# Patient Record
Sex: MALE | Race: WHITE | NOT HISPANIC OR LATINO | URBAN - METROPOLITAN AREA
[De-identification: names, ages, dates, MRNs, and addresses within clinical notes are randomized per-mention and may not be internally consistent; named-entity substitution may affect disease eponyms.]

---

## 2020-05-23 LAB — INR PPP: 1.4 (ref 0.9–1.1)

## 2020-05-24 LAB — INR PPP: 1.5 (ref 0.9–1.1)

## 2020-05-25 LAB — INR PPP: 1.4 (ref 0.9–1.1)

## 2020-05-26 LAB — INR PPP: 1.5 (ref 0.9–1.1)

## 2020-05-27 LAB — INR PPP: 1.6 (ref 0.9–1.1)

## 2020-05-28 ENCOUNTER — OFFICE VISIT - HEALTHEAST (OUTPATIENT)
Dept: GERIATRICS | Facility: CLINIC | Age: 85
End: 2020-05-28

## 2020-05-28 ENCOUNTER — COMMUNICATION - HEALTHEAST (OUTPATIENT)
Dept: ANTICOAGULATION | Facility: CLINIC | Age: 85
End: 2020-05-28

## 2020-05-28 DIAGNOSIS — I48.20 CHRONIC ATRIAL FIBRILLATION (H): ICD-10-CM

## 2020-05-28 DIAGNOSIS — I10 ESSENTIAL HYPERTENSION: ICD-10-CM

## 2020-05-28 DIAGNOSIS — L89.321 STAGE I PRESSURE ULCER OF LEFT BUTTOCK: ICD-10-CM

## 2020-05-28 DIAGNOSIS — N32.81 OAB (OVERACTIVE BLADDER): ICD-10-CM

## 2020-05-28 DIAGNOSIS — R53.1 WEAKNESS: ICD-10-CM

## 2020-05-28 LAB — INR PPP: 1.9 (ref 0.9–1.1)

## 2020-05-29 ENCOUNTER — AMBULATORY - HEALTHEAST (OUTPATIENT)
Dept: ADMINISTRATIVE | Facility: CLINIC | Age: 85
End: 2020-05-29

## 2020-05-29 ENCOUNTER — COMMUNICATION - HEALTHEAST (OUTPATIENT)
Dept: ANTICOAGULATION | Facility: CLINIC | Age: 85
End: 2020-05-29

## 2020-05-29 DIAGNOSIS — I48.20 CHRONIC ATRIAL FIBRILLATION (H): ICD-10-CM

## 2020-05-29 LAB — INR PPP: 2.6 (ref 0.9–1.1)

## 2020-06-01 ENCOUNTER — COMMUNICATION - HEALTHEAST (OUTPATIENT)
Dept: ANTICOAGULATION | Facility: CLINIC | Age: 85
End: 2020-06-01

## 2020-06-01 DIAGNOSIS — I48.20 CHRONIC ATRIAL FIBRILLATION (H): ICD-10-CM

## 2020-06-01 LAB — INR PPP: 2.6 (ref 0.9–1.1)

## 2020-06-02 ENCOUNTER — OFFICE VISIT - HEALTHEAST (OUTPATIENT)
Dept: GERIATRICS | Facility: CLINIC | Age: 85
End: 2020-06-02

## 2020-06-02 DIAGNOSIS — I48.20 CHRONIC ATRIAL FIBRILLATION (H): ICD-10-CM

## 2020-06-02 DIAGNOSIS — L89.321 STAGE I PRESSURE ULCER OF LEFT BUTTOCK: ICD-10-CM

## 2020-06-02 DIAGNOSIS — N32.81 OAB (OVERACTIVE BLADDER): ICD-10-CM

## 2020-06-02 DIAGNOSIS — R53.1 WEAKNESS: ICD-10-CM

## 2020-06-02 DIAGNOSIS — I10 ESSENTIAL HYPERTENSION: ICD-10-CM

## 2020-06-04 ENCOUNTER — COMMUNICATION - HEALTHEAST (OUTPATIENT)
Dept: ANTICOAGULATION | Facility: CLINIC | Age: 85
End: 2020-06-04

## 2020-06-04 ENCOUNTER — OFFICE VISIT - HEALTHEAST (OUTPATIENT)
Dept: GERIATRICS | Facility: CLINIC | Age: 85
End: 2020-06-04

## 2020-06-04 ENCOUNTER — RECORDS - HEALTHEAST (OUTPATIENT)
Dept: LAB | Facility: CLINIC | Age: 85
End: 2020-06-04

## 2020-06-04 DIAGNOSIS — I48.20 CHRONIC ATRIAL FIBRILLATION (H): ICD-10-CM

## 2020-06-04 DIAGNOSIS — I10 ESSENTIAL HYPERTENSION: ICD-10-CM

## 2020-06-04 DIAGNOSIS — L89.321 STAGE I PRESSURE ULCER OF LEFT BUTTOCK: ICD-10-CM

## 2020-06-04 DIAGNOSIS — N32.81 OAB (OVERACTIVE BLADDER): ICD-10-CM

## 2020-06-04 LAB
ANION GAP SERPL CALCULATED.3IONS-SCNC: 6 MMOL/L (ref 5–18)
BUN SERPL-MCNC: 16 MG/DL (ref 8–28)
CALCIUM SERPL-MCNC: 8.6 MG/DL (ref 8.5–10.5)
CHLORIDE BLD-SCNC: 104 MMOL/L (ref 98–107)
CO2 SERPL-SCNC: 30 MMOL/L (ref 22–31)
CREAT SERPL-MCNC: 0.69 MG/DL (ref 0.7–1.3)
GFR SERPL CREATININE-BSD FRML MDRD: >60 ML/MIN/1.73M2
GLUCOSE BLD-MCNC: 87 MG/DL (ref 70–125)
INR PPP: 2.2 (ref 0.9–1.1)
POTASSIUM BLD-SCNC: 4.2 MMOL/L (ref 3.5–5)
SODIUM SERPL-SCNC: 140 MMOL/L (ref 136–145)

## 2020-06-08 ENCOUNTER — COMMUNICATION - HEALTHEAST (OUTPATIENT)
Dept: ANTICOAGULATION | Facility: CLINIC | Age: 85
End: 2020-06-08

## 2020-06-08 DIAGNOSIS — I48.20 CHRONIC ATRIAL FIBRILLATION (H): ICD-10-CM

## 2020-06-08 LAB — INR PPP: 2.5 (ref 0.9–1.1)

## 2020-06-09 ENCOUNTER — OFFICE VISIT - HEALTHEAST (OUTPATIENT)
Dept: GERIATRICS | Facility: CLINIC | Age: 85
End: 2020-06-09

## 2020-06-09 DIAGNOSIS — N17.9 AKI (ACUTE KIDNEY INJURY) (H): ICD-10-CM

## 2020-06-09 DIAGNOSIS — H35.30 MACULAR DEGENERATION, UNSPECIFIED LATERALITY, UNSPECIFIED TYPE: ICD-10-CM

## 2020-06-09 DIAGNOSIS — N32.81 OAB (OVERACTIVE BLADDER): ICD-10-CM

## 2020-06-09 DIAGNOSIS — I10 ESSENTIAL HYPERTENSION: ICD-10-CM

## 2020-06-09 DIAGNOSIS — I48.20 CHRONIC ATRIAL FIBRILLATION (H): ICD-10-CM

## 2020-06-11 ENCOUNTER — COMMUNICATION - HEALTHEAST (OUTPATIENT)
Dept: ANTICOAGULATION | Facility: CLINIC | Age: 85
End: 2020-06-11

## 2020-06-11 ENCOUNTER — OFFICE VISIT - HEALTHEAST (OUTPATIENT)
Dept: GERIATRICS | Facility: CLINIC | Age: 85
End: 2020-06-11

## 2020-06-11 DIAGNOSIS — I10 ESSENTIAL HYPERTENSION: ICD-10-CM

## 2020-06-11 DIAGNOSIS — N32.81 OAB (OVERACTIVE BLADDER): ICD-10-CM

## 2020-06-11 DIAGNOSIS — I48.20 CHRONIC ATRIAL FIBRILLATION (H): ICD-10-CM

## 2020-06-11 DIAGNOSIS — H35.30 MACULAR DEGENERATION, UNSPECIFIED LATERALITY, UNSPECIFIED TYPE: ICD-10-CM

## 2020-06-11 DIAGNOSIS — N17.9 AKI (ACUTE KIDNEY INJURY) (H): ICD-10-CM

## 2020-06-11 LAB — INR PPP: 2.9 (ref 0.9–1.1)

## 2020-06-15 ENCOUNTER — COMMUNICATION - HEALTHEAST (OUTPATIENT)
Dept: ANTICOAGULATION | Facility: CLINIC | Age: 85
End: 2020-06-15

## 2020-06-15 DIAGNOSIS — I48.20 CHRONIC ATRIAL FIBRILLATION (H): ICD-10-CM

## 2020-06-15 LAB — INR PPP: 2.8 (ref 0.9–1.1)

## 2020-06-16 ENCOUNTER — OFFICE VISIT - HEALTHEAST (OUTPATIENT)
Dept: GERIATRICS | Facility: CLINIC | Age: 85
End: 2020-06-16

## 2020-06-16 DIAGNOSIS — H35.30 MACULAR DEGENERATION, UNSPECIFIED LATERALITY, UNSPECIFIED TYPE: ICD-10-CM

## 2020-06-16 DIAGNOSIS — N32.81 OAB (OVERACTIVE BLADDER): ICD-10-CM

## 2020-06-16 DIAGNOSIS — I10 ESSENTIAL HYPERTENSION: ICD-10-CM

## 2020-06-16 DIAGNOSIS — N17.9 AKI (ACUTE KIDNEY INJURY) (H): ICD-10-CM

## 2020-06-16 DIAGNOSIS — I48.20 CHRONIC ATRIAL FIBRILLATION (H): ICD-10-CM

## 2020-06-17 ENCOUNTER — AMBULATORY - HEALTHEAST (OUTPATIENT)
Dept: GERIATRICS | Facility: CLINIC | Age: 85
End: 2020-06-17

## 2020-06-18 ENCOUNTER — AMBULATORY - HEALTHEAST (OUTPATIENT)
Dept: ANTICOAGULATION | Facility: CLINIC | Age: 85
End: 2020-06-18

## 2020-06-18 DIAGNOSIS — I48.20 CHRONIC ATRIAL FIBRILLATION (H): ICD-10-CM

## 2021-05-28 ENCOUNTER — RECORDS - HEALTHEAST (OUTPATIENT)
Dept: ADMINISTRATIVE | Facility: CLINIC | Age: 86
End: 2021-05-28

## 2021-06-08 NOTE — PROGRESS NOTES
Code Status:  FULL CODE  Visit Type: Problem Visit (SHAWNA)     Facility:  [facility] [[id number]]        Facility Type: SNF (Skilled Nursing Facility, TCU)    History of Present Illness: Justin Isidro is a 87 y.o. male who I am seeing today for follow up of SHAWNA on the TCU. Pt recently hospitalized at [facility] on 5/24/2020.  Patient with met past medical history that includes BPH, prostate CA status post brachii therapy with resulting radiation cystitis, erectile dysfunction and overactive bladder syndrome, DVT, subdural hematoma in 2009, atrial fib, DJD and hypertension.  There is no discharge summary for review.  Patient admitted with weakness, acute kidney injury and HTN.  patient given additional metoprolol.  He did have A. fib with rapid ventricular response.  This appears to  improved with metoprolol.  Patient with generalized weakness and inability to ambulate for a few days prior to hospitalization.  Head CT was negative other than ischemic changes.  A chest x-ray was obtained which showed atelectasis of the left lower lobe.  The urinalysis had positive blood otherwise negative.  Culture was negative.  Overactive bladder symptoms on tolterodine.  COVID-19 was negative.  Sodium 134.  AST is 58.  BUN and creatinine 26.  .  Platelets 134,000.  INR was 1.9.  He also continues on digoxin.    Today patient sitting up in bedside chair.  Patient strength improving.  Acute kidney injury.  BMP shows creatinine at 0.69.  Blood pressure satisfactory.  Atrial.  On chronic anticoagulation.  Patient with history of macular degeneration.  He does receive eye injections.  He was to receive an eye injection prior to hospitalization.  Nursing is working on rescheduling this visit upon discharge.    Current Outpatient Medications   Medication Sig     cholecalciferol, vitamin D3, 1,000 unit (25 mcg) tablet Take 1,000 Units by mouth 2 (two) times a day.     digoxin (LANOXIN) 125 mcg (0.125 mg) tablet  Take 125 mcg by mouth daily.     folic acid (FOLVITE) 400 MCG tablet Take 400 mcg by mouth every other day.     metoprolol succinate (TOPROL-XL) 25 MG Take 75 mg by mouth 2 (two) times a day.     senna (SENOKOT) 8.6 mg tablet Take 1-2 tablets by mouth 2 (two) times a day.     senna-docusate (SENNOSIDES-DOCUSATE SODIUM) 8.6-50 mg tablet Take 1 tablet by mouth 2 (two) times a day as needed for constipation.     simvastatin (ZOCOR) 40 MG tablet Take 40 mg by mouth daily.     tolterodine (DETROL LA) 4 MG ER capsule Take 4 mg by mouth daily.     warfarin sodium (WARFARIN ORAL) Take by mouth. 5 mg daily. Next INR 6/1       Allergies   Allergen Reactions     Penicillins          Review of Systems   No fevers or chills. No headache, lightheadedness or dizziness. No SOB, chest pains or palpitations. Appetite is good. No nausea, vomiting, constipation or diarrhea. No dysuria, frequency, burning or pain with urination. Otherwise review of systems are negative.       Physical Exam   PHYSICAL EXAMINATION:  Vital signs: /67, pulse 85, respirations 16, temperature 97.9, O2 sat 96% on room air.  Weight 187 pounds.  General: Awake, Alert, oriented x2, appropriately, follows simple commands, conversant  HEENT:PERRLA, Pink conjunctiva, anicteric sclerae, moist oral mucosa.  Poor dentition  NECK: Supple, without any lymphadenopathy, or masses  CVS:  S1  S2, without murmur or gallop.   LUNG: Clear to auscultation, No wheezes, rales or rhonci.  BACK: No kyphosis of the thoracic spine  ABDOMEN: Soft, nontender to palpation, with positive bowel sounds  EXTREMITIES: Movesboth upper and lower extremities with generalized weakness, trace pedal edema, no calf tenderness  SKIN:  Skin dry and intact.   NEUROLOGIC: Intact, pulses palpable  PSYCHIATRIC: Mild cognitive impairment noted.  CPT 4.8.            Labs:    Results for orders placed or performed in visit on 06/04/20   Basic Metabolic Panel   Result Value Ref Range    Sodium 140 136 -  145 mmol/L    Potassium 4.2 3.5 - 5.0 mmol/L    Chloride 104 98 - 107 mmol/L    CO2 30 22 - 31 mmol/L    Anion Gap, Calculation 6 5 - 18 mmol/L    Glucose 87 70 - 125 mg/dL    Calcium 8.6 8.5 - 10.5 mg/dL    BUN 16 8 - 28 mg/dL    Creatinine 0.69 (L) 0.70 - 1.30 mg/dL    GFR MDRD Af Amer >60 >60 mL/min/1.73m2    GFR MDRD Non Af Amer >60 >60 mL/min/1.73m2           Assessment/Plan:   1.  Kamilla BMP with creatine of 0.69 other wise unremarkable.    2. Chronic atrial fibrillation   continues on metoprolol and digoxin.  Lovenox discontinued. Continue on Coumadin.   INRs managed per the Coumadin clinic.     3. Essential hypertension  Satisfactory control.   4. OAB (overactive bladder)   continues on tolterodine.   5.   Macular degeneration  Patient receives eye injections.  He will have follow-up for injection upon discharge.       Electronically signed by: Kathy Payton, SHARON

## 2021-06-08 NOTE — PROGRESS NOTES
Code Status:  FULL CODE  Visit Type: Discharge Summary     Facility:  [facility] [[id number]]        Facility Type: SNF (Skilled Nursing Facility, TCU)    History of Present Illness: Justin Isidro is a 88 y.o. male who I am seeing today for discharge from the TCU. Pt recently hospitalized at [facility] on 5/24/2020 due to acute kidney injury. Past medical history that includes BPH, prostate CA status post brachii therapy with resulting radiation cystitis, erectile dysfunction and overactive bladder syndrome, DVT, subdural hematoma in 2009, atrial fib, DJD and hypertension.  There is no discharge summary for review.  Patient admitted with weakness, acute kidney injury and HTN.  patient given additional metoprolol.  He did have A. fib with rapid ventricular response.  This appears to  improved with metoprolol.  Patient with generalized weakness and inability to ambulate for a few days prior to hospitalization.  Head CT was negative other than ischemic changes.  A chest x-ray was obtained which showed atelectasis of the left lower lobe.  The urinalysis had positive blood otherwise negative.  Culture was negative.  Overactive bladder symptoms on tolterodine.  COVID-19 was negative.  Sodium 134.  AST is 58.  BUN and creatinine 26.  .  Platelets 134,000.  INR was 1.9.  He also continues on digoxin.    Today patient ambulating with a rolling walker.  He was assisted to a sitting position. Patient admitted with acute kidney injury.  BMP has returned to normal. Patient voiding adequately.  History of overactive bladder syndrome on tolterodine.HTN.  blood pressure satisfactory.  Chronic atrial fib on Coumadin. INR stable.  History of macular degeneration.  He will go for an eye injection tomorrow.   Patient denies any pain.  Previous pressure ulcer to his bottom now healed.      Current Outpatient Medications   Medication Sig     cholecalciferol, vitamin D3, 1,000 unit (25 mcg) tablet Take 1,000  Units by mouth 2 (two) times a day.     digoxin (LANOXIN) 125 mcg (0.125 mg) tablet Take 125 mcg by mouth daily.     folic acid (FOLVITE) 400 MCG tablet Take 400 mcg by mouth every other day.     metoprolol succinate (TOPROL-XL) 25 MG Take 75 mg by mouth 2 (two) times a day.     senna (SENOKOT) 8.6 mg tablet Take 1-2 tablets by mouth 2 (two) times a day.     senna-docusate (SENNOSIDES-DOCUSATE SODIUM) 8.6-50 mg tablet Take 1 tablet by mouth 2 (two) times a day as needed for constipation.     simvastatin (ZOCOR) 40 MG tablet Take 40 mg by mouth daily.     tolterodine (DETROL LA) 4 MG ER capsule Take 4 mg by mouth daily.     warfarin sodium (WARFARIN ORAL) Take by mouth. 5 mg daily. Next INR 6/1       Allergies   Allergen Reactions     Penicillins          Review of Systems   No fevers or chills. No headache, lightheadedness or dizziness. No SOB, chest pains or palpitations. Appetite is good. No nausea, vomiting, constipation or diarrhea. No dysuria, frequency, burning or pain with urination. Otherwise review of systems are negative.       Physical Exam   PHYSICAL EXAMINATION:  Vital signs: /71, pulse 64, respirations 16, temperature 97.9, O2 sat 97% on room air.  Weight 189.4 pounds.  General: Awake, Alert, oriented x2, appropriately, follows simple commands, conversant  HEENT:Pink conjunctiva, anicteric sclerae, moist oral mucosa.  Poor dentition  NECK: Supple, without any lymphadenopathy, or masses  CVS:  S1  S2, without murmur or gallop.   LUNG: Clear to auscultation, No wheezes, rales or rhonci.  BACK: No kyphosis of the thoracic spine  ABDOMEN: Soft, nontender to palpation, with positive bowel sounds  EXTREMITIES: Movesboth upper and lower extremities with generalized weakness, trace pedal edema, no calf tenderness. Ambulates with rolling walker..   SKIN:  Skin dry and intact.   NEUROLOGIC: Intact, pulses palpable  PSYCHIATRIC: Mild cognitive impairment noted.  CPT 4.8.            Labs:    Results for  orders placed or performed in visit on 06/04/20   Basic Metabolic Panel   Result Value Ref Range    Sodium 140 136 - 145 mmol/L    Potassium 4.2 3.5 - 5.0 mmol/L    Chloride 104 98 - 107 mmol/L    CO2 30 22 - 31 mmol/L    Anion Gap, Calculation 6 5 - 18 mmol/L    Glucose 87 70 - 125 mg/dL    Calcium 8.6 8.5 - 10.5 mg/dL    BUN 16 8 - 28 mg/dL    Creatinine 0.69 (L) 0.70 - 1.30 mg/dL    GFR MDRD Af Amer >60 >60 mL/min/1.73m2    GFR MDRD Non Af Amer >60 >60 mL/min/1.73m2           Assessment/Plan:   1.  Kamilla BMP with creatine of 0.69 other wise unremarkable.    2. Chronic atrial fibrillation   continues on metoprolol and digoxin.  Lovenox discontinued. Continue on Coumadin.   INR 2.90. INRs managed per the coumadin clinic.    3. Essential hypertension  Satisfactory control.   4. OAB (overactive bladder)   continues on tolterodine.   5.   Macular degeneration  Patient receives eye injections.  He will have follow-up for injection in am.      Okay to GA to [facility] with current medications and treatments.  Home PT, OT, home health aide and RN for medication management and blood draws.  Recheck INR on 6/22/20. Follow up with PCP in 1 week.     DISCHARGE PLAN/FACE TO FACE:  I certify that this patient is under my care and that I, or a nurse practitioner or physician's assistant working with me, had a face-to-face encounter that meets the physician face-to-face encounter requirements with this patient.        I certify that, based on my findings, the following services are medically necessary home health services.    My clinical findings support the need for the above skilled services.    This patient is homebound because: recent  Hospitalization for acute kidney injury.    The patient is, or has been, under my care and I have initiated the establishment of the plan of care. This patient will be followed by a physician who will periodically review the plan of care.    Electronically signed by: Kathy Payton, SHARON

## 2021-06-08 NOTE — TELEPHONE ENCOUNTER
ANTICOAGULATION  MANAGEMENT    Assessment     Today's INR result of 2.8 is Therapeutic (goal INR of 2.0-3.0)        Previous INR was Therapeutic    Warfarin given as previously instructed    No new health/diet changes affecting INR    No new medication/supplements affecting INR    Continues to tolerate warfarin with no reported s/s of bleeding or thromboembolism       Plan:     Warfarin Dosing Orders:  Continue current warfarin dose 5 mg daily.    Next INR: Thurs 6/18.     Telephone orders given to nurseSusana.  Orders read back correctly.     Willy Avila RN    Subjective/Objective:      Justin Isidro, a 88 y.o. male is on warfarin. Facility nurse reports for Justin:    Other anticoagulants: No    Medication changes: No     Missed warfarin doses since last INR: No     Abnormal bleeding since last INR: No    New symptoms, injury or illness: No     Upcoming surgery, procedure or cardioversion: No    Recent INR Results:    Lab Results   Component Value Date    INR 2.80 (!) 06/15/2020    INR 2.90 (!) 06/11/2020    INR 2.50 (!) 06/08/2020       Anticoagulation Episode Summary     Current INR goal:   2.0-3.0   TTR:   100.0 % (1.1 wk)   Next INR check:   6/18/2020   INR from last check:   2.80 (6/15/2020)   Weekly max warfarin dose:      Target end date:      INR check location:      Preferred lab:      Send INR reminders to:   [facility] (TCU/LTC/shelter)    Indications    Chronic atrial fibrillation (H) [I48.20]           Comments:            Anticoagulation Care Providers     Provider Role Specialty Phone number    Rajeev Tillman DO [facility] [phone]

## 2021-06-08 NOTE — TELEPHONE ENCOUNTER
ANTICOAGULATION  MANAGEMENT    Assessment     Today's INR result of 2.6 is Therapeutic (goal INR of 2.0-3.0)        Previous INR was Therapeutic    Warfarin given as previously instructed    No new health/diet changes affecting INR    No new medication/supplements affecting INR    Continues to tolerate warfarin with no reported s/s of bleeding or thromboembolism       Plan:     Warfarin Dosing Orders:  Continue current warfarin dose 5 mg daily.    Next INR: Thurs 6/4.     Telephone orders given to nurseZofia.  Orders read back correctly.     Willy Avila RN    Subjective/Objective:      Justin Isidro, a 87 y.o. male is on warfarin. Facility nurse reports for Justin:    Other anticoagulants: No    Medication changes: No     Missed warfarin doses since last INR: No     Abnormal bleeding since last INR: No    New symptoms, injury or illness: No     Upcoming surgery, procedure or cardioversion: No    Recent INR Results:    Lab Results   Component Value Date    INR 2.60 (!) 06/01/2020    INR 2.60 (!) 05/29/2020    INR 1.90 (!) 05/28/2020       Anticoagulation Episode Summary     Current INR goal:   2.0-3.0   TTR:   --   Next INR check:   6/4/2020   INR from last check:   2.60 (6/1/2020)   Weekly max warfarin dose:      Target end date:      INR check location:      Preferred lab:      Send INR reminders to:   [facility] (TCU/LTC/OMER)    Indications    Chronic atrial fibrillation [I48.20]           Comments:            Anticoagulation Care Providers     Provider Role Specialty Phone number    Rajeev Tillman DO [facility] [phone]

## 2021-06-08 NOTE — PROGRESS NOTES
ANTICOAGULATION  MANAGEMENT PROGRAM    Justin Isidro is being discharged from the [facility] Anticoagulation Management Program (AC).    Reason for discharge: discharged from [facility] care; returning to pre-admission warfarin management    ACM referral closed, anticoagulation episode resolved and INR standing order discontinued.     If Justin needs warfarin management in the future, please send a new referral.    Willy Avila RN

## 2021-06-08 NOTE — PROGRESS NOTES
Code Status:  FULL CODE  Visit Type: Problem Visit     Facility:  [facility] [[id number]]        Facility Type: SNF (Skilled Nursing Facility, TCU)    History of Present Illness: Justin Isidro is a 87 y.o. male who I am seeing today for follow up on the TCU. Pt recently hospitalized at [facility] on 5/24/2020.  Patient with met past medical history that includes BPH, prostate CA status post brachii therapy with resulting radiation cystitis, erectile dysfunction and overactive bladder syndrome, DVT, subdural hematoma in 2009, atrial fib, DJD and hypertension.  There is no discharge summary for review.  Patient admitted with weakness, acute kidney injury and HTN.  patient given additional metoprolol.  He did have A. fib with rapid ventricular response.  This appears to  improved with metoprolol.  Patient with generalized weakness and inability to ambulate for a few days prior to hospitalization.  Head CT was negative other than ischemic changes.  A chest x-ray was obtained which showed atelectasis of the left lower lobe.  The urinalysis had positive blood otherwise negative.  Culture was negative.  Overactive bladder symptoms on tolterodine.  COVID-19 was negative.  Sodium 134.  AST is 58.  BUN and creatinine 26.  .  Platelets 134,000.  INR was 1.9.  He also continues on digoxin.    Today patient sitting up in bedside chair. Pt strength improving. He reports eating well and having regular bowel movements. He complains of pain in his bottom. He previously had a stage I pressure ulcer. Today area without redness, induration or drainage. Skin dry and intact. HTN. Blood pressures satisfactory controlled. Atrial fib on coumadin and rate controlled with digoxin.     Current Outpatient Medications   Medication Sig     cholecalciferol, vitamin D3, 1,000 unit (25 mcg) tablet Take 1,000 Units by mouth 2 (two) times a day.     digoxin (LANOXIN) 125 mcg (0.125 mg) tablet Take 125 mcg by mouth daily.      folic acid (FOLVITE) 400 MCG tablet Take 400 mcg by mouth every other day.     metoprolol succinate (TOPROL-XL) 25 MG Take 75 mg by mouth 2 (two) times a day.     senna (SENOKOT) 8.6 mg tablet Take 1-2 tablets by mouth 2 (two) times a day.     senna-docusate (SENNOSIDES-DOCUSATE SODIUM) 8.6-50 mg tablet Take 1 tablet by mouth 2 (two) times a day as needed for constipation.     simvastatin (ZOCOR) 40 MG tablet Take 40 mg by mouth daily.     tolterodine (DETROL LA) 4 MG ER capsule Take 4 mg by mouth daily.     warfarin sodium (WARFARIN ORAL) Take by mouth. 5 mg daily. Next INR 6/1       Allergies   Allergen Reactions     Penicillins          Review of Systems   No fevers or chills. No headache, lightheadedness or dizziness. No SOB, chest pains or palpitations. Appetite is good. No nausea, vomiting, constipation or diarrhea. No dysuria, frequency, burning or pain with urination.  Patient complains of pain in his bottom.  Otherwise review of systems are negative.       Physical Exam   PHYSICAL EXAMINATION:  Vital signs: /72, pulse 80, respirations 16, temperature 98.1, O2 sat 93% on room air.  Weight 186.4 pounds.  General: Awake, Alert, oriented x2, appropriately, follows simple commands, conversant  HEENT:PERRLA, Pink conjunctiva, anicteric sclerae, moist oral mucosa.  Poor dentition  NECK: Supple, without any lymphadenopathy, or masses  CVS:  S1  S2, without murmur or gallop.   LUNG: Clear to auscultation, No wheezes, rales or rhonci.  BACK: No kyphosis of the thoracic spine  ABDOMEN: Soft, nontender to palpation, with positive bowel sounds  EXTREMITIES: Movesboth upper and lower extremities with generalized weakness, trace pedal edema, no calf tenderness  SKIN:  Left buttock without redness, induration or warmth.  Skin dry and intact. Some tenderness to touch.  NEUROLOGIC: Intact, pulses palpable  PSYCHIATRIC: Mild cognitive impairment noted.              Labs:    Reviewed in the  records.    Assessment/Plan:   1. Chronic atrial fibrillation   continues on metoprolol and digoxin.  Lovenox discontinued. Continue on Coumadin.   INRs managed per the Coumadin clinic.     2. Essential hypertension  Satisfactory control.   3. OAB (overactive bladder)   continues on tolterodine.   4. Kamilla  Follow up BMP on Thursday.    5. Stage I pressure ulcer of left buttock  Resolved.             Electronically signed by: Kathy Payton, SHARON

## 2021-06-08 NOTE — TELEPHONE ENCOUNTER
ANTICOAGULATION  MANAGEMENT    Assessment     Today's INR result of 1.9 is Subtherapeutic (goal INR of 2.0-3.0)        Previous INR was Subtherapeutic    Warfarin given as previously instructed    Acute health changes, generalized weakness, may be affecting INR    No new medication/supplements affecting INR    Concurrent use of Lovenox and warfarin may increase risk of bleeding, but not expected to affect INR    Continues to tolerate warfarin with no reported s/s of bleeding or thromboembolism       Plan:     Warfarin Dosing Orders: Give 6.25 mg today.     Continue Lovenox q12h.     Next INR: tmr 5/29.     Telephone orders given to nurse, Madalyn.  Orders read back correctly.     Willy Avila RN    Subjective/Objective:      Justin Isidro, a 87 y.o. male is on warfarin recently admitted to TCU under care of [facility].  Chart reviewed:    Outpatient anticoagulation information:     Anticoagulation management provider: [facility]    Reason for anticoagulation: Atrial Fibrillation    Home INR goal: 2-3    Home warfarin dose:  6.25 mg daily on MWF; and 5 mg daily rest of week     Recent hospitalization review:    Reason for hospitalization prior to TCU admission: generalized weakness.    Hospital warfarin management: More warfarin administered than maintenance regimen; anticoagulation calendar update    Hospital medication changes pertinent to anticoagulation: Yes, Lovenox bridge    Health changes pertinent to anticoagulation during hospitalization: No      TCU Facility nurse report since admission:    Other anticoagulants: Yes: Lovenox q12h.     Medication changes: No    Missed warfarin doses since last INR: No     Abnormal bleeding since last INR: No    New symptoms, injury or illness: No     Upcoming surgery, procedure or cardioversion: No      Recent INR Results:    Lab Results   Component Value Date    INR 1.90 (!) 05/28/2020    INR 1.60 (!) 05/27/2020    INR 1.50 (!)  05/26/2020       Anticoagulation Episode Summary     Current INR goal:   2.0-3.0   TTR:   --   Next INR check:   5/29/2020   INR from last check:   1.60! (5/27/2020)   Weekly max warfarin dose:      Target end date:      INR check location:      Preferred lab:      Send INR reminders to:   [facility] (TCU/LTC/OMER)    Indications    Chronic atrial fibrillation [I48.20]           Comments:            Anticoagulation Care Providers     Provider Role Specialty Phone number    Rajeev Tillman,  [facility] [phone]

## 2021-06-08 NOTE — TELEPHONE ENCOUNTER
ANTICOAGULATION  MANAGEMENT    Assessment     Today's INR result of 2.9 is Therapeutic (goal INR of 2.0-3.0)        Previous INR was Therapeutic    Warfarin given as previously instructed    No new health/diet changes affecting INR    No new medication/supplements affecting INR    Continues to tolerate warfarin with no reported s/s of bleeding or thromboembolism       Plan:     Warfarin Dosing Orders:  Continue current warfarin dose 5 mg daily.    Next INR: Mon 6/15.     Telephone orders given to nurseGaviota.  Orders read back correctly.     Willy Avila RN    Subjective/Objective:      Justin Isidro, a 87 y.o. male is on warfarin. Facility nurse reports for Justin:    Other anticoagulants: No    Medication changes: No     Missed warfarin doses since last INR: No     Abnormal bleeding since last INR: No    New symptoms, injury or illness: No     Upcoming surgery, procedure or cardioversion: No    Recent INR Results:    Lab Results   Component Value Date    INR 2.90 (!) 06/11/2020    INR 2.50 (!) 06/08/2020    INR 2.20 (!) 06/04/2020       Anticoagulation Episode Summary     Current INR goal:   2.0-3.0   TTR:   100.0 % (4 d)   Next INR check:   6/15/2020   INR from last check:   2.90 (6/11/2020)   Weekly max warfarin dose:      Target end date:      INR check location:      Preferred lab:      Send INR reminders to:   [facility] (TCU/LTC/OMER)    Indications    Chronic atrial fibrillation [I48.20]           Comments:            Anticoagulation Care Providers     Provider Role Specialty Phone number    Rajeev Tillman DO [facility] [phone]

## 2021-06-08 NOTE — TELEPHONE ENCOUNTER
ANTICOAGULATION  MANAGEMENT    Assessment     Today's INR result of 2.2 is Therapeutic (goal INR of 2.0-3.0)        Previous INR was Therapeutic    Warfarin given as previously instructed    No new health/diet changes affecting INR    No new medication/supplements affecting INR    Continues to tolerate warfarin with no reported s/s of bleeding or thromboembolism       Plan:     Warfarin Dosing Orders:  Give 6.25 mg on Thur and Sat; 5 mg on Fri and Sun.    Next INR: Mon 6/8.     Telephone orders given to nurseRachelle.  Orders read back correctly.     Willy Avila RN    Subjective/Objective:      Justin GEORGE Dwaine, a 87 y.o. male is on warfarin. Facility nurse reports for Justin:    Other anticoagulants: No    Medication changes: No     Missed warfarin doses since last INR: No     Abnormal bleeding since last INR: No    New symptoms, injury or illness: No     Upcoming surgery, procedure or cardioversion: No    Recent INR Results:    Lab Results   Component Value Date    INR 2.20 (!) 06/04/2020    INR 2.60 (!) 06/01/2020    INR 2.60 (!) 05/29/2020       Anticoagulation Episode Summary     Current INR goal:   2.0-3.0   TTR:   --   Next INR check:   6/8/2020   INR from last check:   2.20 (6/4/2020)   Weekly max warfarin dose:      Target end date:      INR check location:      Preferred lab:      Send INR reminders to:   [facility] (TCU/LTC/penitentiary)    Indications    Chronic atrial fibrillation [I48.20]           Comments:            Anticoagulation Care Providers     Provider Role Specialty Phone number    Rajeev Tillman DO [facility] [phone]

## 2021-06-08 NOTE — TELEPHONE ENCOUNTER
ANTICOAGULATION  MANAGEMENT    Assessment     Today's INR result of 2.5 is Therapeutic (goal INR of 2.0-3.0)        Previous INR was Therapeutic    Warfarin given as previously instructed    No new health/diet changes affecting INR    No new medication/supplements affecting INR    Continues to tolerate warfarin with no reported s/s of bleeding or thromboembolism       Plan:     Warfarin Dosing Orders:  Give 5 mg Mon-Wed.    Next INR: Thurs 6/11.    Telephone orders given to nurseKandace.  Orders read back correctly.     Willy Avila RN    Subjective/Objective:      Justin Isidro, a 87 y.o. male is on warfarin. Facility nurse reports for Justin:    Other anticoagulants: No    Medication changes: No     Missed warfarin doses since last INR: No     Abnormal bleeding since last INR: No    New symptoms, injury or illness: No     Upcoming surgery, procedure or cardioversion: No    Recent INR Results:    Lab Results   Component Value Date    INR 2.50 (!) 06/08/2020    INR 2.20 (!) 06/04/2020    INR 2.60 (!) 06/01/2020       Anticoagulation Episode Summary     Current INR goal:   2.0-3.0   TTR:   100.0 % (1 d)   Next INR check:   6/11/2020   INR from last check:   2.50 (6/8/2020)   Weekly max warfarin dose:      Target end date:      INR check location:      Preferred lab:      Send INR reminders to:   [facility] (TCU/LTC/[facility])    Indications    Chronic atrial fibrillation [I48.20]           Comments:            Anticoagulation Care Providers     Provider Role Specialty Phone number    Rajeev Tillman DO [facility] [phone]

## 2021-06-08 NOTE — PROGRESS NOTES
Code Status:  FULL CODE  Visit Type: H & P     Facility:  [facility] [[id number]]        Facility Type: SNF (Skilled Nursing Facility, TCU)    History of Present Illness: Justin Isidro is a 87 y.o. male who I am seeing today for admit to the TCU. Pt recently hospitalized at  on 5/24/2020.  Patient with met past medical history that includes BPH, prostate CA status post brachii therapy with resulting radiation cystitis, erectile dysfunction and overactive bladder syndrome, DVT, subdural hematoma in 2009, atrial fib, DJD and hypertension.  There is no discharge summary for review.  Patient admitted with weakness, acute kidney injury and HTN.  patient given additional metoprolol.  He did have A. fib with rapid ventricular response.  This appears to  improved with metoprolol.  Patient with generalized weakness and inability to ambulate for a few days prior to hospitalization.  Head CT was negative other than ischemic changes.  A chest x-ray was obtained which showed atelectasis of the left lower lobe.  The urinalysis had positive blood otherwise negative.  Culture was negative.  Overactive bladder symptoms on tolterodine.  COVID-19 was negative.  Sodium 134.  AST is 58.  BUN and creatinine 26.  .  Platelets 134,000.  INR was 1.9.  He also continues on digoxin.    Today patient sitting up in bed.  I did see him earlier ambulating around the hallway with family.  Continues with moderate weakness.  Some underlying cognitive impairment.  Patient reports some pain in his buttock.  He did have a pressure ulcer on the left buttock.  It is open to air.  Somewhat tender to touch with tenderness.  I will have nursing staff apply topical treatment.  Patient reports he is voiding adequately.  Having regular bowel movements.  Patient denies any chest pain, shortness of breath or chest palpitations.      Current Outpatient Medications   Medication Sig     cholecalciferol, vitamin D3, 1,000 unit  (25 mcg) tablet Take 1,000 Units by mouth 2 (two) times a day.     digoxin (LANOXIN) 125 mcg (0.125 mg) tablet Take 125 mcg by mouth daily.     enoxaparin ANTICOAGULANT (LOVENOX) 80 mg/0.8 mL syringe Inject 85 mg under the skin every 12 (twelve) hours. Stop once INR if above 2.0.     folic acid (FOLVITE) 400 MCG tablet Take 400 mcg by mouth every other day.     metoprolol succinate (TOPROL-XL) 25 MG Take 75 mg by mouth 2 (two) times a day.     senna-docusate (SENNOSIDES-DOCUSATE SODIUM) 8.6-50 mg tablet Take 1 tablet by mouth 2 (two) times a day as needed for constipation.     simvastatin (ZOCOR) 40 MG tablet Take 40 mg by mouth daily.     tolterodine (DETROL LA) 4 MG ER capsule Take 4 mg by mouth daily.       Allergies   Allergen Reactions     Penicillins          Review of Systems   No fevers or chills. No headache, lightheadedness or dizziness. No SOB, chest pains or palpitations. Appetite is good. No nausea, vomiting, constipation or diarrhea. No dysuria, frequency, burning or pain with urination.  Patient complains of pain in his bottom.  Otherwise review of systems are negative.       Physical Exam   PHYSICAL EXAMINATION:  Vital signs: /94, pulse 69, respirations 16, temperature 97.9, O2 sat 98% on room air.  Weight 190.4 pounds.  General: Awake, Alert, oriented x2, appropriately, follows simple commands, conversant  HEENT:PERRLA, Pink conjunctiva, anicteric sclerae, moist oral mucosa.  Poor dentition  NECK: Supple, without any lymphadenopathy, or masses  CVS:  S1  S2, without murmur or gallop.   LUNG: Clear to auscultation, No wheezes, rales or rhonci.  BACK: No kyphosis of the thoracic spine  ABDOMEN: Soft, nontender to palpation, with positive bowel sounds  EXTREMITIES: Movesboth upper and lower extremities with generalized weakness, trace pedal edema, no calf tenderness  SKIN: Warm and dry, no bruits  Reviewed.  With very little amounts.  Left buttock with slight redness.  Some tenderness to  touch.  NEUROLOGIC: Intact, pulses palpable  PSYCHIATRIC: Mild cognitive impairment noted.              Labs:    Reviewed in the records.    Assessment/Plan:   1. Chronic atrial fibrillation   continues on metoprolol and digoxin.  Also on Lovenox and Coumadin.  DC Lovenox when INR above 2.  INRs managed per the Coumadin clinic.     2. Essential hypertension  Satisfactory control.   3. OAB (overactive bladder)   continues on tolterodine.   4. Weakness   continues with PT.   5. Stage I pressure ulcer of left buttock  Apply anurag and foam dressing Q 3 days. Turn and reposition.          Total 45 minutes of which 50% was spent  interviewing nursing staff, interviewing patient as well as therapy. Follow up CBC and BMP on Monday.       Electronically signed by: Kathy Payton CNP

## 2021-06-08 NOTE — TELEPHONE ENCOUNTER
ANTICOAGULATION  MANAGEMENT    Assessment     Today's INR result of 2.6 is Therapeutic (goal INR of 2.0-3.0)        Previous INR was Subtherapeutic    Warfarin given as previously instructed    No new health/diet changes affecting INR    No new medication/supplements affecting INR    Continues to tolerate warfarin with no reported s/s of bleeding or thromboembolism       Plan:     Warfarin Dosing Orders: Give 5 mg daily.    Discontinue Lovenox now.     Next INR: Mon 6/1.     Telephone orders given to nurseMadalyn.  Orders read back correctly.     Willy Avila RN    Subjective/Objective:      Justinmaddie Yinke, a 87 y.o. male is on warfarin. Facility nurse reports for Jsutin:    Other anticoagulants: No    Medication changes: No     Missed warfarin doses since last INR: No     Abnormal bleeding since last INR: No    New symptoms, injury or illness: No     Upcoming surgery, procedure or cardioversion: No    Recent INR Results:    Lab Results   Component Value Date    INR 2.60 (!) 05/29/2020    INR 1.90 (!) 05/28/2020    INR 1.60 (!) 05/27/2020       Anticoagulation Episode Summary     Current INR goal:   2.0-3.0   TTR:   --   Next INR check:   6/1/2020   INR from last check:   2.60 (5/29/2020)   Weekly max warfarin dose:      Target end date:      INR check location:      Preferred lab:      Send INR reminders to:   [facility] (TCU/LTC/long term)    Indications    Chronic atrial fibrillation [I48.20]           Comments:            Anticoagulation Care Providers     Provider Role Specialty Phone number    Rajeev Tillman DO [facility] [phone]

## 2021-06-08 NOTE — PROGRESS NOTES
Code Status:  FULL CODE  Visit Type: Problem Visit     Facility:  [facility] [[id number]]        Facility Type: SNF (Skilled Nursing Facility, TCU)    History of Present Illness: Justin Isidro is a 87 y.o. male who I am seeing today for follow up on the TCU. Pt recently hospitalized at [facility] on 5/24/2020.  Patient with met past medical history that includes BPH, prostate CA status post brachii therapy with resulting radiation cystitis, erectile dysfunction and overactive bladder syndrome, DVT, subdural hematoma in 2009, atrial fib, DJD and hypertension.  There is no discharge summary for review.  Patient admitted with weakness, acute kidney injury and HTN.  patient given additional metoprolol.  He did have A. fib with rapid ventricular response.  This appears to  improved with metoprolol.  Patient with generalized weakness and inability to ambulate for a few days prior to hospitalization.  Head CT was negative other than ischemic changes.  A chest x-ray was obtained which showed atelectasis of the left lower lobe.  The urinalysis had positive blood otherwise negative.  Culture was negative.  Overactive bladder symptoms on tolterodine.  COVID-19 was negative.  Sodium 134.  AST is 58.  BUN and creatinine 26.  .  Platelets 134,000.  INR was 1.9.  He also continues on digoxin.    Today patient sitting up in bedside chair.  Hypertension.  Blood pressure satisfactory controlled.  Pressure ulcer to his bottom healed.  Atrial fib on chronic anticoagulation.  Strength improving.  Acute kidney injury.  BMP reviewed today unremarkable.      Current Outpatient Medications   Medication Sig     cholecalciferol, vitamin D3, 1,000 unit (25 mcg) tablet Take 1,000 Units by mouth 2 (two) times a day.     digoxin (LANOXIN) 125 mcg (0.125 mg) tablet Take 125 mcg by mouth daily.     folic acid (FOLVITE) 400 MCG tablet Take 400 mcg by mouth every other day.     metoprolol succinate (TOPROL-XL) 25 MG Take  75 mg by mouth 2 (two) times a day.     senna (SENOKOT) 8.6 mg tablet Take 1-2 tablets by mouth 2 (two) times a day.     senna-docusate (SENNOSIDES-DOCUSATE SODIUM) 8.6-50 mg tablet Take 1 tablet by mouth 2 (two) times a day as needed for constipation.     simvastatin (ZOCOR) 40 MG tablet Take 40 mg by mouth daily.     tolterodine (DETROL LA) 4 MG ER capsule Take 4 mg by mouth daily.     warfarin sodium (WARFARIN ORAL) Take by mouth. 5 mg daily. Next INR 6/1       Allergies   Allergen Reactions     Penicillins          Review of Systems   No fevers or chills. No headache, lightheadedness or dizziness. No SOB, chest pains or palpitations. Appetite is good. No nausea, vomiting, constipation or diarrhea. No dysuria, frequency, burning or pain with urination.  Patient complains of pain in his bottom.  Otherwise review of systems are negative.       Physical Exam   PHYSICAL EXAMINATION:  Vital signs: /72, pulse 80, respirations 16, temperature 98.1, O2 sat 93% on room air.  Weight 186.4 pounds.  General: Awake, Alert, oriented x2, appropriately, follows simple commands, conversant  HEENT:PERRLA, Pink conjunctiva, anicteric sclerae, moist oral mucosa.  Poor dentition  NECK: Supple, without any lymphadenopathy, or masses  CVS:  S1  S2, without murmur or gallop.   LUNG: Clear to auscultation, No wheezes, rales or rhonci.  BACK: No kyphosis of the thoracic spine  ABDOMEN: Soft, nontender to palpation, with positive bowel sounds  EXTREMITIES: Movesboth upper and lower extremities with generalized weakness, trace pedal edema, no calf tenderness  SKIN:  Left buttock without redness, induration or warmth.  Skin dry and intact. Some tenderness to touch.  NEUROLOGIC: Intact, pulses palpable  PSYCHIATRIC: Mild cognitive impairment noted.              Labs:    Reviewed in the records.    Assessment/Plan:   1. Chronic atrial fibrillation   continues on metoprolol and digoxin.  Lovenox discontinued. Continue on Coumadin.   INRs  managed per the Coumadin clinic.     2. Essential hypertension  Satisfactory control.   3. OAB (overactive bladder)   continues on tolterodine.   4. Kamilla  BMP with creatine of 0.69 other wise unremarkable.    5. Stage I pressure ulcer of left buttock  Resolved.             Electronically signed by: Kathy Payton, CNP

## 2021-06-08 NOTE — PROGRESS NOTES
Code Status:  FULL CODE  Visit Type: Problem Visit     Facility:  [facility] [[id number]]        Facility Type: SNF (Skilled Nursing Facility, TCU)    History of Present Illness: Justin Isidro is a 87 y.o. male who I am seeing today for follow up of [facility] on the TCU. Pt recently hospitalized at [facility] on 5/24/2020.  Patient with met past medical history that includes BPH, prostate CA status post brachii therapy with resulting radiation cystitis, erectile dysfunction and overactive bladder syndrome, DVT, subdural hematoma in 2009, atrial fib, DJD and hypertension.  There is no discharge summary for review.  Patient admitted with weakness, acute kidney injury and HTN.  patient given additional metoprolol.  He did have A. fib with rapid ventricular response.  This appears to  improved with metoprolol.  Patient with generalized weakness and inability to ambulate for a few days prior to hospitalization.  Head CT was negative other than ischemic changes.  A chest x-ray was obtained which showed atelectasis of the left lower lobe.  The urinalysis had positive blood otherwise negative.  Culture was negative.  Overactive bladder symptoms on tolterodine.  COVID-19 was negative.  Sodium 134.  AST is 58.  BUN and creatinine 26.  .  Platelets 134,000.  INR was 1.9.  He also continues on digoxin.    Today patient sitting up in bedside chair.  Patient admitted with acute kidney injury.  BMP has returned to normal.  Blood pressure satisfactory.  Chronic atrial.  On chronic anticoagulation.  INR stable.  History of macular degeneration.  He will go for an eye injection next Wednesday upon discharge.  Patient denies any pain.  Previous pressure ulcer to his bottom now healed.      Current Outpatient Medications   Medication Sig     cholecalciferol, vitamin D3, 1,000 unit (25 mcg) tablet Take 1,000 Units by mouth 2 (two) times a day.     digoxin (LANOXIN) 125 mcg (0.125 mg) tablet Take 125 mcg by mouth  daily.     folic acid (FOLVITE) 400 MCG tablet Take 400 mcg by mouth every other day.     metoprolol succinate (TOPROL-XL) 25 MG Take 75 mg by mouth 2 (two) times a day.     senna (SENOKOT) 8.6 mg tablet Take 1-2 tablets by mouth 2 (two) times a day.     senna-docusate (SENNOSIDES-DOCUSATE SODIUM) 8.6-50 mg tablet Take 1 tablet by mouth 2 (two) times a day as needed for constipation.     simvastatin (ZOCOR) 40 MG tablet Take 40 mg by mouth daily.     tolterodine (DETROL LA) 4 MG ER capsule Take 4 mg by mouth daily.     warfarin sodium (WARFARIN ORAL) Take by mouth. 5 mg daily. Next INR 6/1       Allergies   Allergen Reactions     Penicillins          Review of Systems   No fevers or chills. No headache, lightheadedness or dizziness. No SOB, chest pains or palpitations. Appetite is good. No nausea, vomiting, constipation or diarrhea. No dysuria, frequency, burning or pain with urination. Otherwise review of systems are negative.       Physical Exam   PHYSICAL EXAMINATION:  Vital signs: /68, pulse 56, respirations 20, temperature 97.9, O2 sat 97% on room air.  Weight 187 pounds.  General: Awake, Alert, oriented x2, appropriately, follows simple commands, conversant  HEENT:PERRLA, Pink conjunctiva, anicteric sclerae, moist oral mucosa.  Poor dentition  NECK: Supple, without any lymphadenopathy, or masses  CVS:  S1  S2, without murmur or gallop.   LUNG: Clear to auscultation, No wheezes, rales or rhonci.  BACK: No kyphosis of the thoracic spine  ABDOMEN: Soft, nontender to palpation, with positive bowel sounds  EXTREMITIES: Movesboth upper and lower extremities with generalized weakness, trace pedal edema, no calf tenderness  SKIN:  Skin dry and intact.   NEUROLOGIC: Intact, pulses palpable  PSYCHIATRIC: Mild cognitive impairment noted.  CPT 4.8.            Labs:    Results for orders placed or performed in visit on 06/04/20   Basic Metabolic Panel   Result Value Ref Range    Sodium 140 136 - 145 mmol/L     Potassium 4.2 3.5 - 5.0 mmol/L    Chloride 104 98 - 107 mmol/L    CO2 30 22 - 31 mmol/L    Anion Gap, Calculation 6 5 - 18 mmol/L    Glucose 87 70 - 125 mg/dL    Calcium 8.6 8.5 - 10.5 mg/dL    BUN 16 8 - 28 mg/dL    Creatinine 0.69 (L) 0.70 - 1.30 mg/dL    GFR MDRD Af Amer >60 >60 mL/min/1.73m2    GFR MDRD Non Af Amer >60 >60 mL/min/1.73m2           Assessment/Plan:   1.  Kamilla BMP with creatine of 0.69 other wise unremarkable.    2. Chronic atrial fibrillation   continues on metoprolol and digoxin.  Lovenox discontinued. Continue on Coumadin.   INR 2.90.      3. Essential hypertension  Satisfactory control.   4. OAB (overactive bladder)   continues on tolterodine.   5.   Macular degeneration  Patient receives eye injections.  He will have follow-up for injection next Wednesday.       Electronically signed by: Kathy Payton, SHARON

## 2021-06-20 NOTE — LETTER
Letter by Kathy Payton CNP at      Author: Kathy Payton CNP Service: -- Author Type: --    Filed:  Encounter Date: 6/16/2020 Status: (Other)         Patient: Justin Isidro   MR Number: [id number]   YOB: 1932   Date of Visit: 6/16/2020     Code Status:  FULL CODE  Visit Type: Discharge Summary     Facility:  [facility] [[id number]]        Facility Type: SNF (Skilled Nursing Facility, TCU)    History of Present Illness: Justin Isidro is a 88 y.o. male who I am seeing today for discharge from the TCU. Pt recently hospitalized at [facility] on 5/24/2020 due to acute kidney injury. Past medical history that includes BPH, prostate CA status post brachii therapy with resulting radiation cystitis, erectile dysfunction and overactive bladder syndrome, DVT, subdural hematoma in 2009, atrial fib, DJD and hypertension.  There is no discharge summary for review.  Patient admitted with weakness, acute kidney injury and HTN.  patient given additional metoprolol.  He did have A. fib with rapid ventricular response.  This appears to  improved with metoprolol.  Patient with generalized weakness and inability to ambulate for a few days prior to hospitalization.  Head CT was negative other than ischemic changes.  A chest x-ray was obtained which showed atelectasis of the left lower lobe.  The urinalysis had positive blood otherwise negative.  Culture was negative.  Overactive bladder symptoms on tolterodine.  COVID-19 was negative.  Sodium 134.  AST is 58.  BUN and creatinine 26.  .  Platelets 134,000.  INR was 1.9.  He also continues on digoxin.    Today patient ambulating with a rolling walker.  He was assisted to a sitting position. Patient admitted with acute kidney injury.  BMP has returned to normal. Patient voiding adequately.  History of overactive bladder syndrome on tolterodine.HTN.  blood pressure satisfactory.  Chronic atrial fib on Coumadin. INR stable.   History of macular degeneration.  He will go for an eye injection tomorrow.   Patient denies any pain.  Previous pressure ulcer to his bottom now healed.      Current Outpatient Medications   Medication Sig   ? cholecalciferol, vitamin D3, 1,000 unit (25 mcg) tablet Take 1,000 Units by mouth 2 (two) times a day.   ? digoxin (LANOXIN) 125 mcg (0.125 mg) tablet Take 125 mcg by mouth daily.   ? folic acid (FOLVITE) 400 MCG tablet Take 400 mcg by mouth every other day.   ? metoprolol succinate (TOPROL-XL) 25 MG Take 75 mg by mouth 2 (two) times a day.   ? senna (SENOKOT) 8.6 mg tablet Take 1-2 tablets by mouth 2 (two) times a day.   ? senna-docusate (SENNOSIDES-DOCUSATE SODIUM) 8.6-50 mg tablet Take 1 tablet by mouth 2 (two) times a day as needed for constipation.   ? simvastatin (ZOCOR) 40 MG tablet Take 40 mg by mouth daily.   ? tolterodine (DETROL LA) 4 MG ER capsule Take 4 mg by mouth daily.   ? warfarin sodium (WARFARIN ORAL) Take by mouth. 5 mg daily. Next INR 6/1       Allergies   Allergen Reactions   ? Penicillins          Review of Systems   No fevers or chills. No headache, lightheadedness or dizziness. No SOB, chest pains or palpitations. Appetite is good. No nausea, vomiting, constipation or diarrhea. No dysuria, frequency, burning or pain with urination. Otherwise review of systems are negative.       Physical Exam   PHYSICAL EXAMINATION:  Vital signs: /71, pulse 64, respirations 16, temperature 97.9, O2 sat 97% on room air.  Weight 189.4 pounds.  General: Awake, Alert, oriented x2, appropriately, follows simple commands, conversant  HEENT:Pink conjunctiva, anicteric sclerae, moist oral mucosa.  Poor dentition  NECK: Supple, without any lymphadenopathy, or masses  CVS:  S1  S2, without murmur or gallop.   LUNG: Clear to auscultation, No wheezes, rales or rhonci.  BACK: No kyphosis of the thoracic spine  ABDOMEN: Soft, nontender to palpation, with positive bowel sounds  EXTREMITIES: Movesboth upper and  lower extremities with generalized weakness, trace pedal edema, no calf tenderness. Ambulates with rolling walker..   SKIN:  Skin dry and intact.   NEUROLOGIC: Intact, pulses palpable  PSYCHIATRIC: Mild cognitive impairment noted.  CPT 4.8.            Labs:    Results for orders placed or performed in visit on 06/04/20   Basic Metabolic Panel   Result Value Ref Range    Sodium 140 136 - 145 mmol/L    Potassium 4.2 3.5 - 5.0 mmol/L    Chloride 104 98 - 107 mmol/L    CO2 30 22 - 31 mmol/L    Anion Gap, Calculation 6 5 - 18 mmol/L    Glucose 87 70 - 125 mg/dL    Calcium 8.6 8.5 - 10.5 mg/dL    BUN 16 8 - 28 mg/dL    Creatinine 0.69 (L) 0.70 - 1.30 mg/dL    GFR MDRD Af Amer >60 >60 mL/min/1.73m2    GFR MDRD Non Af Amer >60 >60 mL/min/1.73m2           Assessment/Plan:   1.  Kamilla BMP with creatine of 0.69 other wise unremarkable.    2. Chronic atrial fibrillation   continues on metoprolol and digoxin.  Lovenox discontinued. Continue on Coumadin.   INR 2.90. INRs managed per the coumadin clinic.    3. Essential hypertension  Satisfactory control.   4. OAB (overactive bladder)   continues on tolterodine.   5.   Macular degeneration  Patient receives eye injections.  He will have follow-up for injection in am.      Okay to MI to [facility] with current medications and treatments.  Home PT, OT, home health aide and RN for medication management and blood draws.  Recheck INR on 6/22/20. Follow up with PCP in 1 week.     DISCHARGE PLAN/FACE TO FACE:  I certify that this patient is under my care and that I, or a nurse practitioner or physician's assistant working with me, had a face-to-face encounter that meets the physician face-to-face encounter requirements with this patient.        I certify that, based on my findings, the following services are medically necessary home health services.    My clinical findings support the need for the above skilled services.    This patient is homebound because: recent  Hospitalization for acute  kidney injury.    The patient is, or has been, under my care and I have initiated the establishment of the plan of care. This patient will be followed by a physician who will periodically review the plan of care.    Electronically signed by: Kathy Payton CNP

## 2021-06-20 NOTE — LETTER
Letter by Kathy Payton CNP at      Author: Kathy Payton CNP Service: -- Author Type: --    Filed:  Encounter Date: 6/9/2020 Status: (Other)         Patient: Justin Isidro   MR Number: [id number]   YOB: 1932   Date of Visit: 6/9/2020     Code Status:  FULL CODE  Visit Type: Problem Visit (SHAWNA)     Facility:  [facility] [[id number]]        Facility Type: SNF (Skilled Nursing Facility, TCU)    History of Present Illness: Justin Isidro is a 87 y.o. male who I am seeing today for follow up of SHAWNA on the TCU. Pt recently hospitalized at [facility] on 5/24/2020.  Patient with met past medical history that includes BPH, prostate CA status post brachii therapy with resulting radiation cystitis, erectile dysfunction and overactive bladder syndrome, DVT, subdural hematoma in 2009, atrial fib, DJD and hypertension.  There is no discharge summary for review.  Patient admitted with weakness, acute kidney injury and HTN.  patient given additional metoprolol.  He did have A. fib with rapid ventricular response.  This appears to  improved with metoprolol.  Patient with generalized weakness and inability to ambulate for a few days prior to hospitalization.  Head CT was negative other than ischemic changes.  A chest x-ray was obtained which showed atelectasis of the left lower lobe.  The urinalysis had positive blood otherwise negative.  Culture was negative.  Overactive bladder symptoms on tolterodine.  COVID-19 was negative.  Sodium 134.  AST is 58.  BUN and creatinine 26.  .  Platelets 134,000.  INR was 1.9.  He also continues on digoxin.    Today patient sitting up in bedside chair.  Patient strength improving.  Acute kidney injury.  BMP shows creatinine at 0.69.  Blood pressure satisfactory.  Atrial.  On chronic anticoagulation.  Patient with history of macular degeneration.  He does receive eye injections.  He was to receive an eye injection prior to hospitalization.   Nursing is working on rescheduling this visit upon discharge.    Current Outpatient Medications   Medication Sig   ? cholecalciferol, vitamin D3, 1,000 unit (25 mcg) tablet Take 1,000 Units by mouth 2 (two) times a day.   ? digoxin (LANOXIN) 125 mcg (0.125 mg) tablet Take 125 mcg by mouth daily.   ? folic acid (FOLVITE) 400 MCG tablet Take 400 mcg by mouth every other day.   ? metoprolol succinate (TOPROL-XL) 25 MG Take 75 mg by mouth 2 (two) times a day.   ? senna (SENOKOT) 8.6 mg tablet Take 1-2 tablets by mouth 2 (two) times a day.   ? senna-docusate (SENNOSIDES-DOCUSATE SODIUM) 8.6-50 mg tablet Take 1 tablet by mouth 2 (two) times a day as needed for constipation.   ? simvastatin (ZOCOR) 40 MG tablet Take 40 mg by mouth daily.   ? tolterodine (DETROL LA) 4 MG ER capsule Take 4 mg by mouth daily.   ? warfarin sodium (WARFARIN ORAL) Take by mouth. 5 mg daily. Next INR 6/1       Allergies   Allergen Reactions   ? Penicillins          Review of Systems   No fevers or chills. No headache, lightheadedness or dizziness. No SOB, chest pains or palpitations. Appetite is good. No nausea, vomiting, constipation or diarrhea. No dysuria, frequency, burning or pain with urination. Otherwise review of systems are negative.       Physical Exam   PHYSICAL EXAMINATION:  Vital signs: /67, pulse 85, respirations 16, temperature 97.9, O2 sat 96% on room air.  Weight 187 pounds.  General: Awake, Alert, oriented x2, appropriately, follows simple commands, conversant  HEENT:PERRLA, Pink conjunctiva, anicteric sclerae, moist oral mucosa.  Poor dentition  NECK: Supple, without any lymphadenopathy, or masses  CVS:  S1  S2, without murmur or gallop.   LUNG: Clear to auscultation, No wheezes, rales or rhonci.  BACK: No kyphosis of the thoracic spine  ABDOMEN: Soft, nontender to palpation, with positive bowel sounds  EXTREMITIES: Movesboth upper and lower extremities with generalized weakness, trace pedal edema, no calf  tenderness  SKIN:  Skin dry and intact.   NEUROLOGIC: Intact, pulses palpable  PSYCHIATRIC: Mild cognitive impairment noted.  CPT 4.8.            Labs:    Results for orders placed or performed in visit on 06/04/20   Basic Metabolic Panel   Result Value Ref Range    Sodium 140 136 - 145 mmol/L    Potassium 4.2 3.5 - 5.0 mmol/L    Chloride 104 98 - 107 mmol/L    CO2 30 22 - 31 mmol/L    Anion Gap, Calculation 6 5 - 18 mmol/L    Glucose 87 70 - 125 mg/dL    Calcium 8.6 8.5 - 10.5 mg/dL    BUN 16 8 - 28 mg/dL    Creatinine 0.69 (L) 0.70 - 1.30 mg/dL    GFR MDRD Af Amer >60 >60 mL/min/1.73m2    GFR MDRD Non Af Amer >60 >60 mL/min/1.73m2           Assessment/Plan:   1.  Kamilla BMP with creatine of 0.69 other wise unremarkable.    2. Chronic atrial fibrillation   continues on metoprolol and digoxin.  Lovenox discontinued. Continue on Coumadin.   INRs managed per the Coumadin clinic.     3. Essential hypertension  Satisfactory control.   4. OAB (overactive bladder)   continues on tolterodine.   5.   Macular degeneration  Patient receives eye injections.  He will have follow-up for injection upon discharge.       Electronically signed by: Kathy Payton CNP

## 2021-06-20 NOTE — LETTER
Letter by Kathy Payton CNP at      Author: Kathy Payton CNP Service: -- Author Type: --    Filed:  Encounter Date: 6/4/2020 Status: (Other)         Patient: Justin Isidro   MR Number: [id number]   YOB: 1932   Date of Visit: 6/4/2020     Code Status:  FULL CODE  Visit Type: Problem Visit     Facility:  [facility] [[id number]]        Facility Type: SNF (Skilled Nursing Facility, TCU)    History of Present Illness: Justin Isidro is a 87 y.o. male who I am seeing today for follow up on the TCU. Pt recently hospitalized at [facility] on 5/24/2020.  Patient with met past medical history that includes BPH, prostate CA status post brachii therapy with resulting radiation cystitis, erectile dysfunction and overactive bladder syndrome, DVT, subdural hematoma in 2009, atrial fib, DJD and hypertension.  There is no discharge summary for review.  Patient admitted with weakness, acute kidney injury and HTN.  patient given additional metoprolol.  He did have A. fib with rapid ventricular response.  This appears to  improved with metoprolol.  Patient with generalized weakness and inability to ambulate for a few days prior to hospitalization.  Head CT was negative other than ischemic changes.  A chest x-ray was obtained which showed atelectasis of the left lower lobe.  The urinalysis had positive blood otherwise negative.  Culture was negative.  Overactive bladder symptoms on tolterodine.  COVID-19 was negative.  Sodium 134.  AST is 58.  BUN and creatinine 26.  .  Platelets 134,000.  INR was 1.9.  He also continues on digoxin.    Today patient sitting up in bedside chair.  Hypertension.  Blood pressure satisfactory controlled.  Pressure ulcer to his bottom healed.  Atrial fib on chronic anticoagulation.  Strength improving.  Acute kidney injury.  BMP reviewed today unremarkable.      Current Outpatient Medications   Medication Sig   ? cholecalciferol, vitamin D3, 1,000  unit (25 mcg) tablet Take 1,000 Units by mouth 2 (two) times a day.   ? digoxin (LANOXIN) 125 mcg (0.125 mg) tablet Take 125 mcg by mouth daily.   ? folic acid (FOLVITE) 400 MCG tablet Take 400 mcg by mouth every other day.   ? metoprolol succinate (TOPROL-XL) 25 MG Take 75 mg by mouth 2 (two) times a day.   ? senna (SENOKOT) 8.6 mg tablet Take 1-2 tablets by mouth 2 (two) times a day.   ? senna-docusate (SENNOSIDES-DOCUSATE SODIUM) 8.6-50 mg tablet Take 1 tablet by mouth 2 (two) times a day as needed for constipation.   ? simvastatin (ZOCOR) 40 MG tablet Take 40 mg by mouth daily.   ? tolterodine (DETROL LA) 4 MG ER capsule Take 4 mg by mouth daily.   ? warfarin sodium (WARFARIN ORAL) Take by mouth. 5 mg daily. Next INR 6/1       Allergies   Allergen Reactions   ? Penicillins          Review of Systems   No fevers or chills. No headache, lightheadedness or dizziness. No SOB, chest pains or palpitations. Appetite is good. No nausea, vomiting, constipation or diarrhea. No dysuria, frequency, burning or pain with urination.  Patient complains of pain in his bottom.  Otherwise review of systems are negative.       Physical Exam   PHYSICAL EXAMINATION:  Vital signs: /72, pulse 80, respirations 16, temperature 98.1, O2 sat 93% on room air.  Weight 186.4 pounds.  General: Awake, Alert, oriented x2, appropriately, follows simple commands, conversant  HEENT:PERRLA, Pink conjunctiva, anicteric sclerae, moist oral mucosa.  Poor dentition  NECK: Supple, without any lymphadenopathy, or masses  CVS:  S1  S2, without murmur or gallop.   LUNG: Clear to auscultation, No wheezes, rales or rhonci.  BACK: No kyphosis of the thoracic spine  ABDOMEN: Soft, nontender to palpation, with positive bowel sounds  EXTREMITIES: Movesboth upper and lower extremities with generalized weakness, trace pedal edema, no calf tenderness  SKIN:  Left buttock without redness, induration or warmth.  Skin dry and intact. Some tenderness to  touch.  NEUROLOGIC: Intact, pulses palpable  PSYCHIATRIC: Mild cognitive impairment noted.              Labs:    Reviewed in the records.    Assessment/Plan:   1. Chronic atrial fibrillation   continues on metoprolol and digoxin.  Lovenox discontinued. Continue on Coumadin.   INRs managed per the Coumadin clinic.     2. Essential hypertension  Satisfactory control.   3. OAB (overactive bladder)   continues on tolterodine.   4. Kamilla  BMP with creatine of 0.69 other wise unremarkable.    5. Stage I pressure ulcer of left buttock  Resolved.             Electronically signed by: Kathy Payton, CNP

## 2021-06-20 NOTE — LETTER
Letter by Kathy Payton CNP at      Author: Kathy Payton CNP Service: -- Author Type: --    Filed:  Encounter Date: 6/2/2020 Status: (Other)         Patient: Justin Isidro   MR Number: [id number]   YOB: 1932   Date of Visit: 6/2/2020     Code Status:  FULL CODE  Visit Type: Problem Visit     Facility:  [facility] [[id number]]        Facility Type: SNF (Skilled Nursing Facility, TCU)    History of Present Illness: Justin Isidro is a 87 y.o. male who I am seeing today for follow up on the TCU. Pt recently hospitalized at [facility] on 5/24/2020.  Patient with met past medical history that includes BPH, prostate CA status post brachii therapy with resulting radiation cystitis, erectile dysfunction and overactive bladder syndrome, DVT, subdural hematoma in 2009, atrial fib, DJD and hypertension.  There is no discharge summary for review.  Patient admitted with weakness, acute kidney injury and HTN.  patient given additional metoprolol.  He did have A. fib with rapid ventricular response.  This appears to  improved with metoprolol.  Patient with generalized weakness and inability to ambulate for a few days prior to hospitalization.  Head CT was negative other than ischemic changes.  A chest x-ray was obtained which showed atelectasis of the left lower lobe.  The urinalysis had positive blood otherwise negative.  Culture was negative.  Overactive bladder symptoms on tolterodine.  COVID-19 was negative.  Sodium 134.  AST is 58.  BUN and creatinine 26.  .  Platelets 134,000.  INR was 1.9.  He also continues on digoxin.    Today patient sitting up in bedside chair. Pt strength improving. He reports eating well and having regular bowel movements. He complains of pain in his bottom. He previously had a stage I pressure ulcer. Today area without redness, induration or drainage. Skin dry and intact. HTN. Blood pressures satisfactory controlled. Atrial fib on coumadin  and rate controlled with digoxin.     Current Outpatient Medications   Medication Sig   ? cholecalciferol, vitamin D3, 1,000 unit (25 mcg) tablet Take 1,000 Units by mouth 2 (two) times a day.   ? digoxin (LANOXIN) 125 mcg (0.125 mg) tablet Take 125 mcg by mouth daily.   ? folic acid (FOLVITE) 400 MCG tablet Take 400 mcg by mouth every other day.   ? metoprolol succinate (TOPROL-XL) 25 MG Take 75 mg by mouth 2 (two) times a day.   ? senna (SENOKOT) 8.6 mg tablet Take 1-2 tablets by mouth 2 (two) times a day.   ? senna-docusate (SENNOSIDES-DOCUSATE SODIUM) 8.6-50 mg tablet Take 1 tablet by mouth 2 (two) times a day as needed for constipation.   ? simvastatin (ZOCOR) 40 MG tablet Take 40 mg by mouth daily.   ? tolterodine (DETROL LA) 4 MG ER capsule Take 4 mg by mouth daily.   ? warfarin sodium (WARFARIN ORAL) Take by mouth. 5 mg daily. Next INR 6/1       Allergies   Allergen Reactions   ? Penicillins          Review of Systems   No fevers or chills. No headache, lightheadedness or dizziness. No SOB, chest pains or palpitations. Appetite is good. No nausea, vomiting, constipation or diarrhea. No dysuria, frequency, burning or pain with urination.  Patient complains of pain in his bottom.  Otherwise review of systems are negative.       Physical Exam   PHYSICAL EXAMINATION:  Vital signs: /72, pulse 80, respirations 16, temperature 98.1, O2 sat 93% on room air.  Weight 186.4 pounds.  General: Awake, Alert, oriented x2, appropriately, follows simple commands, conversant  HEENT:PERRLA, Pink conjunctiva, anicteric sclerae, moist oral mucosa.  Poor dentition  NECK: Supple, without any lymphadenopathy, or masses  CVS:  S1  S2, without murmur or gallop.   LUNG: Clear to auscultation, No wheezes, rales or rhonci.  BACK: No kyphosis of the thoracic spine  ABDOMEN: Soft, nontender to palpation, with positive bowel sounds  EXTREMITIES: Movesboth upper and lower extremities with generalized weakness, trace pedal edema, no  calf tenderness  SKIN:  Left buttock without redness, induration or warmth.  Skin dry and intact. Some tenderness to touch.  NEUROLOGIC: Intact, pulses palpable  PSYCHIATRIC: Mild cognitive impairment noted.              Labs:    Reviewed in the records.    Assessment/Plan:   1. Chronic atrial fibrillation   continues on metoprolol and digoxin.  Lovenox discontinued. Continue on Coumadin.   INRs managed per the Coumadin clinic.     2. Essential hypertension  Satisfactory control.   3. OAB (overactive bladder)   continues on tolterodine.   4. Kamilla  Follow up BMP on Thursday.    5. Stage I pressure ulcer of left buttock  Resolved.             Electronically signed by: Kathy Payton, CNP

## 2021-06-20 NOTE — LETTER
Letter by Kathy Payton CNP at      Author: Kathy Payton CNP Service: -- Author Type: --    Filed:  Encounter Date: 5/28/2020 Status: (Other)         Patient: Justin Isidro   MR Number: [id number]   YOB: 1932   Date of Visit: 5/28/2020     Code Status:  FULL CODE  Visit Type: H & P     Facility:  [facility] [[id number]]        Facility Type: SNF (Skilled Nursing Facility, TCU)    History of Present Illness: Justin Isidro is a 87 y.o. male who I am seeing today for admit to the TCU. Pt recently hospitalized at [facility] on 5/24/2020.  Patient with met past medical history that includes BPH, prostate CA status post brachii therapy with resulting radiation cystitis, erectile dysfunction and overactive bladder syndrome, DVT, subdural hematoma in 2009, atrial fib, DJD and hypertension.  There is no discharge summary for review.  Patient admitted with weakness, acute kidney injury and HTN.  patient given additional metoprolol.  He did have A. fib with rapid ventricular response.  This appears to  improved with metoprolol.  Patient with generalized weakness and inability to ambulate for a few days prior to hospitalization.  Head CT was negative other than ischemic changes.  A chest x-ray was obtained which showed atelectasis of the left lower lobe.  The urinalysis had positive blood otherwise negative.  Culture was negative.  Overactive bladder symptoms on tolterodine.  COVID-19 was negative.  Sodium 134.  AST is 58.  BUN and creatinine 26.  .  Platelets 134,000.  INR was 1.9.  He also continues on digoxin.    Today patient sitting up in bed.  I did see him earlier ambulating around the hallway with family.  Continues with moderate weakness.  Some underlying cognitive impairment.  Patient reports some pain in his buttock.  He did have a pressure ulcer on the left buttock.  It is open to air.  Somewhat tender to touch with tenderness.  I will have nursing staff  apply topical treatment.  Patient reports he is voiding adequately.  Having regular bowel movements.  Patient denies any chest pain, shortness of breath or chest palpitations.      Current Outpatient Medications   Medication Sig   ? cholecalciferol, vitamin D3, 1,000 unit (25 mcg) tablet Take 1,000 Units by mouth 2 (two) times a day.   ? digoxin (LANOXIN) 125 mcg (0.125 mg) tablet Take 125 mcg by mouth daily.   ? enoxaparin ANTICOAGULANT (LOVENOX) 80 mg/0.8 mL syringe Inject 85 mg under the skin every 12 (twelve) hours. Stop once INR if above 2.0.   ? folic acid (FOLVITE) 400 MCG tablet Take 400 mcg by mouth every other day.   ? metoprolol succinate (TOPROL-XL) 25 MG Take 75 mg by mouth 2 (two) times a day.   ? senna-docusate (SENNOSIDES-DOCUSATE SODIUM) 8.6-50 mg tablet Take 1 tablet by mouth 2 (two) times a day as needed for constipation.   ? simvastatin (ZOCOR) 40 MG tablet Take 40 mg by mouth daily.   ? tolterodine (DETROL LA) 4 MG ER capsule Take 4 mg by mouth daily.       Allergies   Allergen Reactions   ? Penicillins          Review of Systems   No fevers or chills. No headache, lightheadedness or dizziness. No SOB, chest pains or palpitations. Appetite is good. No nausea, vomiting, constipation or diarrhea. No dysuria, frequency, burning or pain with urination.  Patient complains of pain in his bottom.  Otherwise review of systems are negative.       Physical Exam   PHYSICAL EXAMINATION:  Vital signs: /94, pulse 69, respirations 16, temperature 97.9, O2 sat 98% on room air.  Weight 190.4 pounds.  General: Awake, Alert, oriented x2, appropriately, follows simple commands, conversant  HEENT:PERRLA, Pink conjunctiva, anicteric sclerae, moist oral mucosa.  Poor dentition  NECK: Supple, without any lymphadenopathy, or masses  CVS:  S1  S2, without murmur or gallop.   LUNG: Clear to auscultation, No wheezes, rales or rhonci.  BACK: No kyphosis of the thoracic spine  ABDOMEN: Soft, nontender to palpation,  with positive bowel sounds  EXTREMITIES: Movesboth upper and lower extremities with generalized weakness, trace pedal edema, no calf tenderness  SKIN: Warm and dry, no bruits  Reviewed.  With very little amounts.  Left buttock with slight redness.  Some tenderness to touch.  NEUROLOGIC: Intact, pulses palpable  PSYCHIATRIC: Mild cognitive impairment noted.              Labs:    Reviewed in the records.    Assessment/Plan:   1. Chronic atrial fibrillation   continues on metoprolol and digoxin.  Also on Lovenox and Coumadin.  DC Lovenox when INR above 2.  INRs managed per the Coumadin clinic.     2. Essential hypertension  Satisfactory control.   3. OAB (overactive bladder)   continues on tolterodine.   4. Weakness   continues with PT.   5. Stage I pressure ulcer of left buttock  Apply anurag and foam dressing Q 3 days. Turn and reposition.          Total 45 minutes of which 50% was spent  interviewing nursing staff, interviewing patient as well as therapy. Follow up CBC and BMP on Monday.       Electronically signed by: Kathy Payton CNP

## 2021-06-20 NOTE — LETTER
Letter by Kathy Payton CNP at      Author: Kathy Payton CNP Service: -- Author Type: --    Filed:  Encounter Date: 6/11/2020 Status: (Other)         Patient: Justin Isidro   MR Number: [id number]   YOB: 1932   Date of Visit: 6/11/2020     Code Status:  FULL CODE  Visit Type: Problem Visit     Facility:  [facility] [[id number]]        Facility Type: SNF (Skilled Nursing Facility, TCU)    History of Present Illness: Justin Isidro is a 87 y.o. male who I am seeing today for follow up of [facility] on the TCU. Pt recently hospitalized at [facility] on 5/24/2020.  Patient with met past medical history that includes BPH, prostate CA status post brachii therapy with resulting radiation cystitis, erectile dysfunction and overactive bladder syndrome, DVT, subdural hematoma in 2009, atrial fib, DJD and hypertension.  There is no discharge summary for review.  Patient admitted with weakness, acute kidney injury and HTN.  patient given additional metoprolol.  He did have A. fib with rapid ventricular response.  This appears to  improved with metoprolol.  Patient with generalized weakness and inability to ambulate for a few days prior to hospitalization.  Head CT was negative other than ischemic changes.  A chest x-ray was obtained which showed atelectasis of the left lower lobe.  The urinalysis had positive blood otherwise negative.  Culture was negative.  Overactive bladder symptoms on tolterodine.  COVID-19 was negative.  Sodium 134.  AST is 58.  BUN and creatinine 26.  .  Platelets 134,000.  INR was 1.9.  He also continues on digoxin.    Today patient sitting up in bedside chair.  Patient admitted with acute kidney injury.  BMP has returned to normal.  Blood pressure satisfactory.  Chronic atrial.  On chronic anticoagulation.  INR stable.  History of macular degeneration.  He will go for an eye injection next Wednesday upon discharge.  Patient denies any pain.  Previous  pressure ulcer to his bottom now healed.      Current Outpatient Medications   Medication Sig   ? cholecalciferol, vitamin D3, 1,000 unit (25 mcg) tablet Take 1,000 Units by mouth 2 (two) times a day.   ? digoxin (LANOXIN) 125 mcg (0.125 mg) tablet Take 125 mcg by mouth daily.   ? folic acid (FOLVITE) 400 MCG tablet Take 400 mcg by mouth every other day.   ? metoprolol succinate (TOPROL-XL) 25 MG Take 75 mg by mouth 2 (two) times a day.   ? senna (SENOKOT) 8.6 mg tablet Take 1-2 tablets by mouth 2 (two) times a day.   ? senna-docusate (SENNOSIDES-DOCUSATE SODIUM) 8.6-50 mg tablet Take 1 tablet by mouth 2 (two) times a day as needed for constipation.   ? simvastatin (ZOCOR) 40 MG tablet Take 40 mg by mouth daily.   ? tolterodine (DETROL LA) 4 MG ER capsule Take 4 mg by mouth daily.   ? warfarin sodium (WARFARIN ORAL) Take by mouth. 5 mg daily. Next INR 6/1       Allergies   Allergen Reactions   ? Penicillins          Review of Systems   No fevers or chills. No headache, lightheadedness or dizziness. No SOB, chest pains or palpitations. Appetite is good. No nausea, vomiting, constipation or diarrhea. No dysuria, frequency, burning or pain with urination. Otherwise review of systems are negative.       Physical Exam   PHYSICAL EXAMINATION:  Vital signs: /68, pulse 56, respirations 20, temperature 97.9, O2 sat 97% on room air.  Weight 187 pounds.  General: Awake, Alert, oriented x2, appropriately, follows simple commands, conversant  HEENT:PERRLA, Pink conjunctiva, anicteric sclerae, moist oral mucosa.  Poor dentition  NECK: Supple, without any lymphadenopathy, or masses  CVS:  S1  S2, without murmur or gallop.   LUNG: Clear to auscultation, No wheezes, rales or rhonci.  BACK: No kyphosis of the thoracic spine  ABDOMEN: Soft, nontender to palpation, with positive bowel sounds  EXTREMITIES: Movesboth upper and lower extremities with generalized weakness, trace pedal edema, no calf tenderness  SKIN:  Skin dry and  intact.   NEUROLOGIC: Intact, pulses palpable  PSYCHIATRIC: Mild cognitive impairment noted.  CPT 4.8.            Labs:    Results for orders placed or performed in visit on 06/04/20   Basic Metabolic Panel   Result Value Ref Range    Sodium 140 136 - 145 mmol/L    Potassium 4.2 3.5 - 5.0 mmol/L    Chloride 104 98 - 107 mmol/L    CO2 30 22 - 31 mmol/L    Anion Gap, Calculation 6 5 - 18 mmol/L    Glucose 87 70 - 125 mg/dL    Calcium 8.6 8.5 - 10.5 mg/dL    BUN 16 8 - 28 mg/dL    Creatinine 0.69 (L) 0.70 - 1.30 mg/dL    GFR MDRD Af Amer >60 >60 mL/min/1.73m2    GFR MDRD Non Af Amer >60 >60 mL/min/1.73m2           Assessment/Plan:   1.  [place] BMP with creatine of 0.69 other wise unremarkable.    2. Chronic atrial fibrillation   continues on metoprolol and digoxin.  Lovenox discontinued. Continue on Coumadin.   INR 2.90.      3. Essential hypertension  Satisfactory control.   4. OAB (overactive bladder)   continues on tolterodine.   5.   Macular degeneration  Patient receives eye injections.  He will have follow-up for injection next Wednesday.       Electronically signed by: Kathy Payton CNP

## 2023-01-27 ENCOUNTER — LAB REQUISITION (OUTPATIENT)
Dept: LAB | Facility: CLINIC | Age: 88
End: 2023-01-27
Payer: MEDICARE

## 2023-01-27 DIAGNOSIS — D68.51 ACTIVATED PROTEIN C RESISTANCE (H): ICD-10-CM

## 2023-01-27 DIAGNOSIS — I65.1 OCCLUSION AND STENOSIS OF BASILAR ARTERY: ICD-10-CM

## 2023-01-27 DIAGNOSIS — I48.20 CHRONIC ATRIAL FIBRILLATION, UNSPECIFIED (H): ICD-10-CM

## 2023-01-28 LAB
ANION GAP SERPL CALCULATED.3IONS-SCNC: 18 MMOL/L (ref 7–15)
BUN SERPL-MCNC: 14.4 MG/DL (ref 8–23)
CALCIUM SERPL-MCNC: 9.7 MG/DL (ref 8.2–9.6)
CHLORIDE SERPL-SCNC: 102 MMOL/L (ref 98–107)
CREAT SERPL-MCNC: 0.87 MG/DL (ref 0.67–1.17)
DEPRECATED HCO3 PLAS-SCNC: 22 MMOL/L (ref 22–29)
GFR SERPL CREATININE-BSD FRML MDRD: 82 ML/MIN/1.73M2
GLUCOSE SERPL-MCNC: 82 MG/DL (ref 70–99)
POTASSIUM SERPL-SCNC: 4.5 MMOL/L (ref 3.4–5.3)
SODIUM SERPL-SCNC: 142 MMOL/L (ref 136–145)

## 2023-01-28 PROCEDURE — 82374 ASSAY BLOOD CARBON DIOXIDE: CPT | Performed by: FAMILY MEDICINE

## 2023-01-28 PROCEDURE — P9603 ONE-WAY ALLOW PRORATED MILES: HCPCS | Performed by: FAMILY MEDICINE

## 2023-01-28 PROCEDURE — 36415 COLL VENOUS BLD VENIPUNCTURE: CPT | Performed by: FAMILY MEDICINE

## 2023-01-28 PROCEDURE — 82310 ASSAY OF CALCIUM: CPT | Performed by: FAMILY MEDICINE

## 2023-06-10 ENCOUNTER — LAB REQUISITION (OUTPATIENT)
Dept: LAB | Facility: CLINIC | Age: 88
End: 2023-06-10
Payer: MEDICARE

## 2023-06-10 DIAGNOSIS — Z51.81 ENCOUNTER FOR THERAPEUTIC DRUG LEVEL MONITORING: ICD-10-CM

## 2023-06-22 LAB — INR PPP: 1.2 (ref 0.85–1.15)

## 2023-06-22 PROCEDURE — P9604 ONE-WAY ALLOW PRORATED TRIP: HCPCS | Mod: ORL

## 2023-06-22 PROCEDURE — 36415 COLL VENOUS BLD VENIPUNCTURE: CPT | Mod: ORL

## 2023-06-22 PROCEDURE — 85610 PROTHROMBIN TIME: CPT | Mod: ORL

## 2023-08-28 ENCOUNTER — LAB REQUISITION (OUTPATIENT)
Dept: LAB | Facility: CLINIC | Age: 88
End: 2023-08-28
Payer: MEDICARE

## 2023-08-28 DIAGNOSIS — I48.91 UNSPECIFIED ATRIAL FIBRILLATION (H): ICD-10-CM

## 2023-08-29 ENCOUNTER — LAB REQUISITION (OUTPATIENT)
Dept: LAB | Facility: CLINIC | Age: 88
End: 2023-08-29
Payer: MEDICARE

## 2023-08-29 DIAGNOSIS — I10 ESSENTIAL (PRIMARY) HYPERTENSION: ICD-10-CM

## 2023-08-29 DIAGNOSIS — I48.20 CHRONIC ATRIAL FIBRILLATION, UNSPECIFIED (H): ICD-10-CM

## 2023-08-29 LAB — INR PPP: 2.88 (ref 0.85–1.15)

## 2023-08-29 PROCEDURE — P9603 ONE-WAY ALLOW PRORATED MILES: HCPCS

## 2023-08-29 PROCEDURE — 36415 COLL VENOUS BLD VENIPUNCTURE: CPT

## 2023-08-29 PROCEDURE — 85610 PROTHROMBIN TIME: CPT

## 2023-08-30 LAB
ANION GAP SERPL CALCULATED.3IONS-SCNC: 10 MMOL/L (ref 7–15)
BUN SERPL-MCNC: 18.5 MG/DL (ref 8–23)
CALCIUM SERPL-MCNC: 8.7 MG/DL (ref 8.2–9.6)
CHLORIDE SERPL-SCNC: 103 MMOL/L (ref 98–107)
CREAT SERPL-MCNC: 0.69 MG/DL (ref 0.67–1.17)
DEPRECATED HCO3 PLAS-SCNC: 26 MMOL/L (ref 22–29)
DIGOXIN SERPL-MCNC: 0.8 NG/ML (ref 0.6–2)
ERYTHROCYTE [DISTWIDTH] IN BLOOD BY AUTOMATED COUNT: 14.1 % (ref 10–15)
GFR SERPL CREATININE-BSD FRML MDRD: 87 ML/MIN/1.73M2
GLUCOSE SERPL-MCNC: 100 MG/DL (ref 70–99)
HCT VFR BLD AUTO: 43 % (ref 40–53)
HGB BLD-MCNC: 13.9 G/DL (ref 13.3–17.7)
MCH RBC QN AUTO: 30.7 PG (ref 26.5–33)
MCHC RBC AUTO-ENTMCNC: 32.3 G/DL (ref 31.5–36.5)
MCV RBC AUTO: 95 FL (ref 78–100)
PLATELET # BLD AUTO: 150 10E3/UL (ref 150–450)
POTASSIUM SERPL-SCNC: 3.9 MMOL/L (ref 3.4–5.3)
RBC # BLD AUTO: 4.53 10E6/UL (ref 4.4–5.9)
SODIUM SERPL-SCNC: 139 MMOL/L (ref 136–145)
WBC # BLD AUTO: 6.4 10E3/UL (ref 4–11)

## 2023-08-30 PROCEDURE — 80162 ASSAY OF DIGOXIN TOTAL: CPT

## 2023-08-30 PROCEDURE — 80048 BASIC METABOLIC PNL TOTAL CA: CPT

## 2023-08-30 PROCEDURE — 85027 COMPLETE CBC AUTOMATED: CPT

## 2023-08-30 PROCEDURE — P9604 ONE-WAY ALLOW PRORATED TRIP: HCPCS

## 2023-08-30 PROCEDURE — 36415 COLL VENOUS BLD VENIPUNCTURE: CPT

## 2023-09-05 ENCOUNTER — LAB REQUISITION (OUTPATIENT)
Dept: LAB | Facility: CLINIC | Age: 88
End: 2023-09-05
Payer: MEDICARE

## 2023-09-05 DIAGNOSIS — D64.9 ANEMIA, UNSPECIFIED: ICD-10-CM

## 2023-09-05 DIAGNOSIS — I10 ESSENTIAL (PRIMARY) HYPERTENSION: ICD-10-CM

## 2023-09-06 LAB
ANION GAP SERPL CALCULATED.3IONS-SCNC: 11 MMOL/L (ref 7–15)
BUN SERPL-MCNC: 16.6 MG/DL (ref 8–23)
CALCIUM SERPL-MCNC: 9.3 MG/DL (ref 8.2–9.6)
CHLORIDE SERPL-SCNC: 103 MMOL/L (ref 98–107)
CREAT SERPL-MCNC: 0.71 MG/DL (ref 0.67–1.17)
DEPRECATED HCO3 PLAS-SCNC: 24 MMOL/L (ref 22–29)
EGFRCR SERPLBLD CKD-EPI 2021: 87 ML/MIN/1.73M2
ERYTHROCYTE [DISTWIDTH] IN BLOOD BY AUTOMATED COUNT: 13.9 % (ref 10–15)
GLUCOSE SERPL-MCNC: 107 MG/DL (ref 70–99)
HCT VFR BLD AUTO: 46.8 % (ref 40–53)
HGB BLD-MCNC: 15.2 G/DL (ref 13.3–17.7)
MCH RBC QN AUTO: 30.3 PG (ref 26.5–33)
MCHC RBC AUTO-ENTMCNC: 32.5 G/DL (ref 31.5–36.5)
MCV RBC AUTO: 93 FL (ref 78–100)
PLATELET # BLD AUTO: 256 10E3/UL (ref 150–450)
POTASSIUM SERPL-SCNC: 4.4 MMOL/L (ref 3.4–5.3)
RBC # BLD AUTO: 5.02 10E6/UL (ref 4.4–5.9)
SODIUM SERPL-SCNC: 138 MMOL/L (ref 136–145)
WBC # BLD AUTO: 5.8 10E3/UL (ref 4–11)

## 2023-09-06 PROCEDURE — P9604 ONE-WAY ALLOW PRORATED TRIP: HCPCS

## 2023-09-06 PROCEDURE — 82310 ASSAY OF CALCIUM: CPT

## 2023-09-06 PROCEDURE — 85027 COMPLETE CBC AUTOMATED: CPT

## 2023-09-06 PROCEDURE — 36415 COLL VENOUS BLD VENIPUNCTURE: CPT

## 2023-09-27 ENCOUNTER — LAB REQUISITION (OUTPATIENT)
Dept: LAB | Facility: CLINIC | Age: 88
End: 2023-09-27
Payer: MEDICARE

## 2023-09-27 DIAGNOSIS — I48.91 UNSPECIFIED ATRIAL FIBRILLATION (H): ICD-10-CM

## 2023-09-28 ENCOUNTER — LAB REQUISITION (OUTPATIENT)
Dept: LAB | Facility: CLINIC | Age: 88
End: 2023-09-28
Payer: MEDICARE

## 2023-09-28 DIAGNOSIS — I10 ESSENTIAL (PRIMARY) HYPERTENSION: ICD-10-CM

## 2023-09-28 DIAGNOSIS — E78.2 MIXED HYPERLIPIDEMIA: ICD-10-CM

## 2023-09-28 DIAGNOSIS — I69.898 OTHER SEQUELAE OF OTHER CEREBROVASCULAR DISEASE: ICD-10-CM

## 2023-09-28 LAB
ALBUMIN SERPL BCG-MCNC: 3.4 G/DL (ref 3.5–5.2)
ALP SERPL-CCNC: 94 U/L (ref 40–129)
ALT SERPL W P-5'-P-CCNC: 16 U/L (ref 0–70)
ANION GAP SERPL CALCULATED.3IONS-SCNC: 11 MMOL/L (ref 7–15)
AST SERPL W P-5'-P-CCNC: 32 U/L (ref 0–45)
BASOPHILS # BLD AUTO: 0 10E3/UL (ref 0–0.2)
BASOPHILS NFR BLD AUTO: 1 %
BILIRUB SERPL-MCNC: 0.8 MG/DL
BUN SERPL-MCNC: 13 MG/DL (ref 8–23)
CALCIUM SERPL-MCNC: 9.3 MG/DL (ref 8.2–9.6)
CHLORIDE SERPL-SCNC: 105 MMOL/L (ref 98–107)
CHOLEST SERPL-MCNC: 148 MG/DL
CREAT SERPL-MCNC: 0.76 MG/DL (ref 0.67–1.17)
EGFRCR SERPLBLD CKD-EPI 2021: 85 ML/MIN/1.73M2
EOSINOPHIL # BLD AUTO: 0.2 10E3/UL (ref 0–0.7)
EOSINOPHIL NFR BLD AUTO: 3 %
ERYTHROCYTE [DISTWIDTH] IN BLOOD BY AUTOMATED COUNT: 15 % (ref 10–15)
GLUCOSE SERPL-MCNC: 83 MG/DL (ref 70–99)
HCO3 SERPL-SCNC: 24 MMOL/L (ref 22–29)
HCT VFR BLD AUTO: 41.5 % (ref 40–53)
HDLC SERPL-MCNC: 48 MG/DL
HGB BLD-MCNC: 13.5 G/DL (ref 13.3–17.7)
IMM GRANULOCYTES # BLD: 0 10E3/UL
IMM GRANULOCYTES NFR BLD: 0 %
INR PPP: 3.02 (ref 0.85–1.15)
LDLC SERPL CALC-MCNC: 80 MG/DL
LYMPHOCYTES # BLD AUTO: 0.9 10E3/UL (ref 0.8–5.3)
LYMPHOCYTES NFR BLD AUTO: 16 %
MCH RBC QN AUTO: 30.5 PG (ref 26.5–33)
MCHC RBC AUTO-ENTMCNC: 32.5 G/DL (ref 31.5–36.5)
MCV RBC AUTO: 94 FL (ref 78–100)
MONOCYTES # BLD AUTO: 0.7 10E3/UL (ref 0–1.3)
MONOCYTES NFR BLD AUTO: 13 %
NEUTROPHILS # BLD AUTO: 3.7 10E3/UL (ref 1.6–8.3)
NEUTROPHILS NFR BLD AUTO: 67 %
NONHDLC SERPL-MCNC: 100 MG/DL
NRBC # BLD AUTO: 0 10E3/UL
NRBC BLD AUTO-RTO: 0 /100
PLATELET # BLD AUTO: 137 10E3/UL (ref 150–450)
POTASSIUM SERPL-SCNC: 4.3 MMOL/L (ref 3.4–5.3)
PROT SERPL-MCNC: 6.9 G/DL (ref 6.4–8.3)
RBC # BLD AUTO: 4.42 10E6/UL (ref 4.4–5.9)
SODIUM SERPL-SCNC: 140 MMOL/L (ref 135–145)
TRIGL SERPL-MCNC: 99 MG/DL
TSH SERPL DL<=0.005 MIU/L-ACNC: 2.91 UIU/ML (ref 0.3–4.2)
VIT B12 SERPL-MCNC: 319 PG/ML (ref 232–1245)
WBC # BLD AUTO: 5.4 10E3/UL (ref 4–11)

## 2023-09-28 PROCEDURE — 85610 PROTHROMBIN TIME: CPT | Mod: ORL | Performed by: PHYSICIAN ASSISTANT

## 2023-09-28 PROCEDURE — P9604 ONE-WAY ALLOW PRORATED TRIP: HCPCS | Mod: ORL | Performed by: PHYSICIAN ASSISTANT

## 2023-09-28 PROCEDURE — 85025 COMPLETE CBC W/AUTO DIFF WBC: CPT | Mod: ORL | Performed by: PHYSICIAN ASSISTANT

## 2023-09-28 PROCEDURE — 36415 COLL VENOUS BLD VENIPUNCTURE: CPT | Mod: ORL | Performed by: PHYSICIAN ASSISTANT

## 2023-09-28 PROCEDURE — 84443 ASSAY THYROID STIM HORMONE: CPT | Mod: ORL | Performed by: PHYSICIAN ASSISTANT

## 2023-09-28 PROCEDURE — 80053 COMPREHEN METABOLIC PANEL: CPT | Mod: ORL | Performed by: PHYSICIAN ASSISTANT

## 2023-09-28 PROCEDURE — 82607 VITAMIN B-12: CPT | Mod: ORL | Performed by: PHYSICIAN ASSISTANT

## 2023-09-28 PROCEDURE — 80061 LIPID PANEL: CPT | Mod: ORL | Performed by: PHYSICIAN ASSISTANT

## 2023-10-04 ENCOUNTER — LAB REQUISITION (OUTPATIENT)
Dept: LAB | Facility: CLINIC | Age: 88
End: 2023-10-04
Payer: MEDICARE

## 2023-10-04 DIAGNOSIS — I48.11 LONGSTANDING PERSISTENT ATRIAL FIBRILLATION (H): ICD-10-CM

## 2023-10-05 LAB — INR PPP: 2.23 (ref 0.85–1.15)

## 2023-10-05 PROCEDURE — 36415 COLL VENOUS BLD VENIPUNCTURE: CPT | Mod: ORL | Performed by: PHYSICIAN ASSISTANT

## 2023-10-05 PROCEDURE — P9604 ONE-WAY ALLOW PRORATED TRIP: HCPCS | Mod: ORL | Performed by: PHYSICIAN ASSISTANT

## 2023-10-05 PROCEDURE — 85610 PROTHROMBIN TIME: CPT | Mod: ORL | Performed by: PHYSICIAN ASSISTANT

## 2023-10-11 ENCOUNTER — LAB REQUISITION (OUTPATIENT)
Dept: LAB | Facility: CLINIC | Age: 88
End: 2023-10-11
Payer: MEDICARE

## 2023-10-11 DIAGNOSIS — I48.11 LONGSTANDING PERSISTENT ATRIAL FIBRILLATION (H): ICD-10-CM

## 2023-10-12 LAB — INR PPP: 1.48 (ref 0.85–1.15)

## 2023-10-12 PROCEDURE — 36415 COLL VENOUS BLD VENIPUNCTURE: CPT | Mod: ORL | Performed by: PHYSICIAN ASSISTANT

## 2023-10-12 PROCEDURE — P9604 ONE-WAY ALLOW PRORATED TRIP: HCPCS | Mod: ORL | Performed by: PHYSICIAN ASSISTANT

## 2023-10-12 PROCEDURE — 85610 PROTHROMBIN TIME: CPT | Mod: ORL | Performed by: PHYSICIAN ASSISTANT

## 2023-11-08 ENCOUNTER — LAB REQUISITION (OUTPATIENT)
Dept: LAB | Facility: CLINIC | Age: 88
End: 2023-11-08
Payer: MEDICARE

## 2023-11-08 DIAGNOSIS — I48.11 LONGSTANDING PERSISTENT ATRIAL FIBRILLATION (H): ICD-10-CM

## 2023-11-09 LAB
ERYTHROCYTE [DISTWIDTH] IN BLOOD BY AUTOMATED COUNT: 15.4 % (ref 10–15)
HCT VFR BLD AUTO: 42.7 % (ref 40–53)
HGB BLD-MCNC: 13.9 G/DL (ref 13.3–17.7)
MCH RBC QN AUTO: 31.3 PG (ref 26.5–33)
MCHC RBC AUTO-ENTMCNC: 32.6 G/DL (ref 31.5–36.5)
MCV RBC AUTO: 96 FL (ref 78–100)
PLATELET # BLD AUTO: 153 10E3/UL (ref 150–450)
RBC # BLD AUTO: 4.44 10E6/UL (ref 4.4–5.9)
WBC # BLD AUTO: 5.8 10E3/UL (ref 4–11)

## 2023-11-09 PROCEDURE — 36415 COLL VENOUS BLD VENIPUNCTURE: CPT | Mod: ORL | Performed by: PHYSICIAN ASSISTANT

## 2023-11-09 PROCEDURE — P9604 ONE-WAY ALLOW PRORATED TRIP: HCPCS | Mod: ORL | Performed by: PHYSICIAN ASSISTANT

## 2023-11-09 PROCEDURE — 85027 COMPLETE CBC AUTOMATED: CPT | Mod: ORL | Performed by: PHYSICIAN ASSISTANT

## 2024-03-27 ENCOUNTER — LAB REQUISITION (OUTPATIENT)
Dept: LAB | Facility: CLINIC | Age: 89
End: 2024-03-27
Payer: MEDICARE

## 2024-03-27 DIAGNOSIS — I49.5 SICK SINUS SYNDROME (H): ICD-10-CM

## 2024-03-28 LAB
ANION GAP SERPL CALCULATED.3IONS-SCNC: 12 MMOL/L (ref 7–15)
BUN SERPL-MCNC: 15.9 MG/DL (ref 8–23)
CALCIUM SERPL-MCNC: 9.7 MG/DL (ref 8.2–9.6)
CHLORIDE SERPL-SCNC: 101 MMOL/L (ref 98–107)
CREAT SERPL-MCNC: 0.8 MG/DL (ref 0.67–1.17)
DEPRECATED HCO3 PLAS-SCNC: 27 MMOL/L (ref 22–29)
DIGOXIN SERPL-MCNC: 0.6 NG/ML (ref 0.6–2)
EGFRCR SERPLBLD CKD-EPI 2021: 84 ML/MIN/1.73M2
GLUCOSE SERPL-MCNC: 79 MG/DL (ref 70–99)
POTASSIUM SERPL-SCNC: 4.1 MMOL/L (ref 3.4–5.3)
SODIUM SERPL-SCNC: 140 MMOL/L (ref 135–145)

## 2024-03-28 PROCEDURE — 80162 ASSAY OF DIGOXIN TOTAL: CPT | Mod: ORL | Performed by: PHYSICIAN ASSISTANT

## 2024-03-28 PROCEDURE — 36415 COLL VENOUS BLD VENIPUNCTURE: CPT | Mod: ORL | Performed by: PHYSICIAN ASSISTANT

## 2024-03-28 PROCEDURE — 80048 BASIC METABOLIC PNL TOTAL CA: CPT | Mod: ORL | Performed by: PHYSICIAN ASSISTANT

## 2024-03-28 PROCEDURE — P9603 ONE-WAY ALLOW PRORATED MILES: HCPCS | Mod: ORL | Performed by: PHYSICIAN ASSISTANT

## 2024-09-24 ENCOUNTER — LAB REQUISITION (OUTPATIENT)
Dept: LAB | Facility: CLINIC | Age: 89
End: 2024-09-24
Payer: MEDICARE

## 2024-09-24 DIAGNOSIS — R60.0 LOCALIZED EDEMA: ICD-10-CM

## 2024-09-26 LAB
ANION GAP SERPL CALCULATED.3IONS-SCNC: 9 MMOL/L (ref 7–15)
BUN SERPL-MCNC: 14.6 MG/DL (ref 8–23)
CALCIUM SERPL-MCNC: 9.3 MG/DL (ref 8.8–10.4)
CHLORIDE SERPL-SCNC: 103 MMOL/L (ref 98–107)
CREAT SERPL-MCNC: 0.85 MG/DL (ref 0.67–1.17)
EGFRCR SERPLBLD CKD-EPI 2021: 82 ML/MIN/1.73M2
GLUCOSE SERPL-MCNC: 95 MG/DL (ref 70–99)
HCO3 SERPL-SCNC: 25 MMOL/L (ref 22–29)
POTASSIUM SERPL-SCNC: 4.1 MMOL/L (ref 3.4–5.3)
SODIUM SERPL-SCNC: 137 MMOL/L (ref 135–145)

## 2024-09-26 PROCEDURE — P9604 ONE-WAY ALLOW PRORATED TRIP: HCPCS | Mod: ORL | Performed by: PHYSICIAN ASSISTANT

## 2024-09-26 PROCEDURE — 80048 BASIC METABOLIC PNL TOTAL CA: CPT | Mod: ORL | Performed by: PHYSICIAN ASSISTANT

## 2024-09-26 PROCEDURE — 36415 COLL VENOUS BLD VENIPUNCTURE: CPT | Mod: ORL | Performed by: PHYSICIAN ASSISTANT

## 2024-12-03 ENCOUNTER — LAB REQUISITION (OUTPATIENT)
Dept: LAB | Facility: CLINIC | Age: 89
End: 2024-12-03
Payer: MEDICARE

## 2024-12-03 DIAGNOSIS — I48.11 LONGSTANDING PERSISTENT ATRIAL FIBRILLATION (H): ICD-10-CM

## 2024-12-05 LAB
ERYTHROCYTE [DISTWIDTH] IN BLOOD BY AUTOMATED COUNT: 13.8 % (ref 10–15)
HCT VFR BLD AUTO: 46.7 % (ref 40–53)
HGB BLD-MCNC: 14.7 G/DL (ref 13.3–17.7)
MCH RBC QN AUTO: 31 PG (ref 26.5–33)
MCHC RBC AUTO-ENTMCNC: 31.5 G/DL (ref 31.5–36.5)
MCV RBC AUTO: 99 FL (ref 78–100)
PLATELET # BLD AUTO: 143 10E3/UL (ref 150–450)
RBC # BLD AUTO: 4.74 10E6/UL (ref 4.4–5.9)
WBC # BLD AUTO: 5.2 10E3/UL (ref 4–11)

## 2024-12-05 PROCEDURE — P9604 ONE-WAY ALLOW PRORATED TRIP: HCPCS | Mod: ORL | Performed by: PHYSICIAN ASSISTANT

## 2024-12-05 PROCEDURE — 85027 COMPLETE CBC AUTOMATED: CPT | Mod: ORL | Performed by: PHYSICIAN ASSISTANT

## 2024-12-05 PROCEDURE — 36415 COLL VENOUS BLD VENIPUNCTURE: CPT | Mod: ORL | Performed by: PHYSICIAN ASSISTANT

## 2025-02-25 ENCOUNTER — LAB REQUISITION (OUTPATIENT)
Dept: LAB | Facility: CLINIC | Age: OVER 89
End: 2025-02-25
Payer: MEDICARE

## 2025-02-25 DIAGNOSIS — I48.11 LONGSTANDING PERSISTENT ATRIAL FIBRILLATION (H): ICD-10-CM

## 2025-02-27 LAB
ALBUMIN SERPL BCG-MCNC: 3.7 G/DL (ref 3.5–5.2)
ALP SERPL-CCNC: 100 U/L (ref 40–150)
ALT SERPL W P-5'-P-CCNC: 19 U/L (ref 0–70)
AST SERPL W P-5'-P-CCNC: 36 U/L (ref 0–45)
BILIRUB DIRECT SERPL-MCNC: 0.36 MG/DL (ref 0–0.3)
BILIRUB SERPL-MCNC: 0.9 MG/DL
DIGOXIN SERPL-MCNC: 0.5 NG/ML (ref 0.6–1.2)
PROT SERPL-MCNC: 7.2 G/DL (ref 6.4–8.3)

## 2025-02-27 PROCEDURE — P9604 ONE-WAY ALLOW PRORATED TRIP: HCPCS | Mod: ORL | Performed by: PHYSICIAN ASSISTANT

## 2025-02-27 PROCEDURE — 36415 COLL VENOUS BLD VENIPUNCTURE: CPT | Mod: ORL | Performed by: PHYSICIAN ASSISTANT

## 2025-02-27 PROCEDURE — 80162 ASSAY OF DIGOXIN TOTAL: CPT | Mod: ORL | Performed by: PHYSICIAN ASSISTANT

## 2025-02-27 PROCEDURE — 80076 HEPATIC FUNCTION PANEL: CPT | Mod: ORL | Performed by: PHYSICIAN ASSISTANT

## 2025-08-04 ENCOUNTER — LAB REQUISITION (OUTPATIENT)
Dept: LAB | Facility: CLINIC | Age: OVER 89
End: 2025-08-04
Payer: MEDICARE

## 2025-08-04 DIAGNOSIS — I48.11 LONGSTANDING PERSISTENT ATRIAL FIBRILLATION (H): ICD-10-CM

## 2025-08-07 LAB
ANION GAP SERPL CALCULATED.3IONS-SCNC: 11 MMOL/L (ref 7–15)
BUN SERPL-MCNC: 16.2 MG/DL (ref 8–23)
CALCIUM SERPL-MCNC: 9.4 MG/DL (ref 8.8–10.4)
CHLORIDE SERPL-SCNC: 102 MMOL/L (ref 98–107)
CREAT SERPL-MCNC: 0.7 MG/DL (ref 0.67–1.17)
DIGOXIN SERPL-MCNC: 0.6 NG/ML (ref 0.6–1.2)
EGFRCR SERPLBLD CKD-EPI 2021: 86 ML/MIN/1.73M2
GLUCOSE SERPL-MCNC: 80 MG/DL (ref 70–99)
HCO3 SERPL-SCNC: 26 MMOL/L (ref 22–29)
POTASSIUM SERPL-SCNC: 3.7 MMOL/L (ref 3.4–5.3)
SODIUM SERPL-SCNC: 139 MMOL/L (ref 135–145)

## 2025-08-07 PROCEDURE — P9604 ONE-WAY ALLOW PRORATED TRIP: HCPCS | Mod: ORL | Performed by: PHYSICIAN ASSISTANT

## 2025-08-07 PROCEDURE — 80162 ASSAY OF DIGOXIN TOTAL: CPT | Mod: ORL | Performed by: PHYSICIAN ASSISTANT

## 2025-08-07 PROCEDURE — 80048 BASIC METABOLIC PNL TOTAL CA: CPT | Mod: ORL | Performed by: PHYSICIAN ASSISTANT

## 2025-08-07 PROCEDURE — 36415 COLL VENOUS BLD VENIPUNCTURE: CPT | Mod: ORL | Performed by: PHYSICIAN ASSISTANT
